# Patient Record
Sex: FEMALE | Race: WHITE | NOT HISPANIC OR LATINO | URBAN - METROPOLITAN AREA
[De-identification: names, ages, dates, MRNs, and addresses within clinical notes are randomized per-mention and may not be internally consistent; named-entity substitution may affect disease eponyms.]

---

## 2017-02-14 ENCOUNTER — OUTPATIENT (OUTPATIENT)
Dept: OUTPATIENT SERVICES | Facility: HOSPITAL | Age: 52
LOS: 1 days | Discharge: HOME | End: 2017-02-14

## 2017-02-14 DIAGNOSIS — C50.919 MALIGNANT NEOPLASM OF UNSPECIFIED SITE OF UNSPECIFIED FEMALE BREAST: Chronic | ICD-10-CM

## 2017-02-14 DIAGNOSIS — C50.911 MALIGNANT NEOPLASM OF UNSPECIFIED SITE OF RIGHT FEMALE BREAST: ICD-10-CM

## 2017-02-14 DIAGNOSIS — D05.91 UNSPECIFIED TYPE OF CARCINOMA IN SITU OF RIGHT BREAST: Chronic | ICD-10-CM

## 2017-02-16 ENCOUNTER — APPOINTMENT (OUTPATIENT)
Dept: HEMATOLOGY ONCOLOGY | Facility: CLINIC | Age: 52
End: 2017-02-16

## 2017-02-16 VITALS
SYSTOLIC BLOOD PRESSURE: 120 MMHG | DIASTOLIC BLOOD PRESSURE: 78 MMHG | RESPIRATION RATE: 16 BRPM | WEIGHT: 127.87 LBS | BODY MASS INDEX: 24.16 KG/M2 | TEMPERATURE: 98.2 F | OXYGEN SATURATION: 100 % | HEART RATE: 78 BPM

## 2017-02-16 DIAGNOSIS — E78.5 HYPERLIPIDEMIA, UNSPECIFIED: ICD-10-CM

## 2017-02-16 DIAGNOSIS — R73.01 IMPAIRED FASTING GLUCOSE: ICD-10-CM

## 2017-05-11 ENCOUNTER — APPOINTMENT (OUTPATIENT)
Dept: DERMATOLOGY | Facility: CLINIC | Age: 52
End: 2017-05-11

## 2017-05-11 VITALS — SYSTOLIC BLOOD PRESSURE: 100 MMHG | DIASTOLIC BLOOD PRESSURE: 60 MMHG

## 2017-05-11 DIAGNOSIS — I78.1 NEVUS, NON-NEOPLASTIC: ICD-10-CM

## 2017-05-11 DIAGNOSIS — Z12.83 ENCOUNTER FOR SCREENING FOR MALIGNANT NEOPLASM OF SKIN: ICD-10-CM

## 2017-05-11 DIAGNOSIS — D22.9 MELANOCYTIC NEVI, UNSPECIFIED: ICD-10-CM

## 2017-05-11 DIAGNOSIS — L81.4 OTHER MELANIN HYPERPIGMENTATION: ICD-10-CM

## 2017-08-02 ENCOUNTER — FORM ENCOUNTER (OUTPATIENT)
Age: 52
End: 2017-08-02

## 2017-08-03 ENCOUNTER — APPOINTMENT (OUTPATIENT)
Dept: ULTRASOUND IMAGING | Facility: CLINIC | Age: 52
End: 2017-08-03
Payer: COMMERCIAL

## 2017-08-03 ENCOUNTER — LABORATORY RESULT (OUTPATIENT)
Age: 52
End: 2017-08-03

## 2017-08-03 ENCOUNTER — OUTPATIENT (OUTPATIENT)
Dept: OUTPATIENT SERVICES | Facility: HOSPITAL | Age: 52
LOS: 1 days | End: 2017-08-03
Payer: COMMERCIAL

## 2017-08-03 DIAGNOSIS — C50.919 MALIGNANT NEOPLASM OF UNSPECIFIED SITE OF UNSPECIFIED FEMALE BREAST: Chronic | ICD-10-CM

## 2017-08-03 DIAGNOSIS — Z00.8 ENCOUNTER FOR OTHER GENERAL EXAMINATION: ICD-10-CM

## 2017-08-03 DIAGNOSIS — D05.91 UNSPECIFIED TYPE OF CARCINOMA IN SITU OF RIGHT BREAST: Chronic | ICD-10-CM

## 2017-08-03 PROCEDURE — 76641 ULTRASOUND BREAST COMPLETE: CPT | Mod: 26,50

## 2017-08-03 PROCEDURE — 76856 US EXAM PELVIC COMPLETE: CPT | Mod: 26,59

## 2017-08-03 PROCEDURE — 76830 TRANSVAGINAL US NON-OB: CPT | Mod: 26

## 2017-08-03 PROCEDURE — 76830 TRANSVAGINAL US NON-OB: CPT

## 2017-08-03 PROCEDURE — 76641 ULTRASOUND BREAST COMPLETE: CPT

## 2017-08-03 PROCEDURE — 76856 US EXAM PELVIC COMPLETE: CPT

## 2017-08-04 ENCOUNTER — RESULT REVIEW (OUTPATIENT)
Age: 52
End: 2017-08-04

## 2017-08-07 ENCOUNTER — RESULT REVIEW (OUTPATIENT)
Age: 52
End: 2017-08-07

## 2017-08-07 ENCOUNTER — OUTPATIENT (OUTPATIENT)
Dept: OUTPATIENT SERVICES | Facility: HOSPITAL | Age: 52
LOS: 1 days | Discharge: HOME | End: 2017-08-07

## 2017-08-07 DIAGNOSIS — C50.919 MALIGNANT NEOPLASM OF UNSPECIFIED SITE OF UNSPECIFIED FEMALE BREAST: Chronic | ICD-10-CM

## 2017-08-07 DIAGNOSIS — D05.91 UNSPECIFIED TYPE OF CARCINOMA IN SITU OF RIGHT BREAST: Chronic | ICD-10-CM

## 2017-08-07 DIAGNOSIS — C50.911 MALIGNANT NEOPLASM OF UNSPECIFIED SITE OF RIGHT FEMALE BREAST: ICD-10-CM

## 2017-08-09 ENCOUNTER — APPOINTMENT (OUTPATIENT)
Dept: HEMATOLOGY ONCOLOGY | Facility: CLINIC | Age: 52
End: 2017-08-09
Payer: COMMERCIAL

## 2017-08-09 VITALS
OXYGEN SATURATION: 98 % | WEIGHT: 130.07 LBS | RESPIRATION RATE: 16 BRPM | DIASTOLIC BLOOD PRESSURE: 79 MMHG | SYSTOLIC BLOOD PRESSURE: 124 MMHG | HEART RATE: 66 BPM | TEMPERATURE: 98.1 F | BODY MASS INDEX: 24.58 KG/M2

## 2017-08-09 DIAGNOSIS — R74.0 NONSPECIFIC ELEVATION OF LEVELS OF TRANSAMINASE AND LACTIC ACID DEHYDROGENASE [LDH]: ICD-10-CM

## 2017-08-09 PROCEDURE — 99214 OFFICE O/P EST MOD 30 MIN: CPT

## 2017-09-02 ENCOUNTER — LABORATORY RESULT (OUTPATIENT)
Age: 52
End: 2017-09-02

## 2017-10-20 ENCOUNTER — RX RENEWAL (OUTPATIENT)
Age: 52
End: 2017-10-20

## 2017-11-12 ENCOUNTER — RX RENEWAL (OUTPATIENT)
Age: 52
End: 2017-11-12

## 2018-02-01 ENCOUNTER — OUTPATIENT (OUTPATIENT)
Dept: OUTPATIENT SERVICES | Facility: HOSPITAL | Age: 53
LOS: 1 days | Discharge: HOME | End: 2018-02-01

## 2018-02-01 DIAGNOSIS — C50.919 MALIGNANT NEOPLASM OF UNSPECIFIED SITE OF UNSPECIFIED FEMALE BREAST: Chronic | ICD-10-CM

## 2018-02-01 DIAGNOSIS — C50.911 MALIGNANT NEOPLASM OF UNSPECIFIED SITE OF RIGHT FEMALE BREAST: ICD-10-CM

## 2018-02-01 DIAGNOSIS — D05.91 UNSPECIFIED TYPE OF CARCINOMA IN SITU OF RIGHT BREAST: Chronic | ICD-10-CM

## 2018-02-01 DIAGNOSIS — D72.819 DECREASED WHITE BLOOD CELL COUNT, UNSPECIFIED: ICD-10-CM

## 2018-02-01 DIAGNOSIS — M85.80 OTHER SPECIFIED DISORDERS OF BONE DENSITY AND STRUCTURE, UNSPECIFIED SITE: ICD-10-CM

## 2018-02-03 ENCOUNTER — LABORATORY RESULT (OUTPATIENT)
Age: 53
End: 2018-02-03

## 2018-02-05 LAB
ALBUMIN SERPL ELPH-MCNC: 4.5 G/DL
ALP BLD-CCNC: 82 U/L
ALT SERPL-CCNC: 25 U/L
ANION GAP SERPL CALC-SCNC: 12 MMOL/L
AST SERPL-CCNC: 33 U/L
BASOPHILS # BLD AUTO: 0.02 K/UL
BASOPHILS NFR BLD AUTO: 0.6 %
BILIRUB SERPL-MCNC: 0.4 MG/DL
BUN SERPL-MCNC: 18 MG/DL
CALCIUM SERPL-MCNC: 9.5 MG/DL
CANCER AG27-29 SERPL-ACNC: 15.6 U/ML
CEA SERPL-MCNC: 1.1 NG/ML
CHLORIDE SERPL-SCNC: 104 MMOL/L
CHOLEST SERPL-MCNC: 194 MG/DL
CHOLEST/HDLC SERPL: 2.8 RATIO
CO2 SERPL-SCNC: 28 MMOL/L
CREAT SERPL-MCNC: 0.81 MG/DL
EOSINOPHIL # BLD AUTO: 0.09 K/UL
EOSINOPHIL NFR BLD AUTO: 2.5 %
ESTIMATED AVERAGE GLUCOSE: 114 MG/DL
GLUCOSE SERPL-MCNC: 101 MG/DL
HBA1C MFR BLD HPLC: 5.6 %
HCT VFR BLD CALC: 37.6 %
HDLC SERPL-MCNC: 69 MG/DL
HGB BLD-MCNC: 12.7 G/DL
IMM GRANULOCYTES NFR BLD AUTO: 0 %
LDLC SERPL CALC-MCNC: 116 MG/DL
LYMPHOCYTES # BLD AUTO: 1.57 K/UL
LYMPHOCYTES NFR BLD AUTO: 43.3 %
MAN DIFF?: NORMAL
MCHC RBC-ENTMCNC: 30.5 PG
MCHC RBC-ENTMCNC: 33.8 GM/DL
MCV RBC AUTO: 90.4 FL
MONOCYTES # BLD AUTO: 0.29 K/UL
MONOCYTES NFR BLD AUTO: 8 %
NEUTROPHILS # BLD AUTO: 1.66 K/UL
NEUTROPHILS NFR BLD AUTO: 45.6 %
PLATELET # BLD AUTO: 200 K/UL
POTASSIUM SERPL-SCNC: 4.7 MMOL/L
PROT SERPL-MCNC: 6.9 G/DL
RBC # BLD: 4.16 M/UL
RBC # FLD: 13.2 %
SODIUM SERPL-SCNC: 144 MMOL/L
TRIGL SERPL-MCNC: 45 MG/DL
WBC # FLD AUTO: 3.63 K/UL

## 2018-02-06 ENCOUNTER — APPOINTMENT (OUTPATIENT)
Dept: INFUSION THERAPY | Facility: HOSPITAL | Age: 53
End: 2018-02-06

## 2018-02-06 ENCOUNTER — APPOINTMENT (OUTPATIENT)
Dept: HEMATOLOGY ONCOLOGY | Facility: CLINIC | Age: 53
End: 2018-02-06
Payer: COMMERCIAL

## 2018-02-06 VITALS
WEIGHT: 136.69 LBS | OXYGEN SATURATION: 99 % | HEART RATE: 69 BPM | TEMPERATURE: 98.3 F | SYSTOLIC BLOOD PRESSURE: 107 MMHG | DIASTOLIC BLOOD PRESSURE: 71 MMHG | BODY MASS INDEX: 25.83 KG/M2 | RESPIRATION RATE: 16 BRPM

## 2018-02-06 DIAGNOSIS — Z23 ENCOUNTER FOR IMMUNIZATION: ICD-10-CM

## 2018-02-06 PROCEDURE — 99214 OFFICE O/P EST MOD 30 MIN: CPT

## 2018-04-27 ENCOUNTER — MEDICATION RENEWAL (OUTPATIENT)
Age: 53
End: 2018-04-27

## 2018-07-05 ENCOUNTER — FORM ENCOUNTER (OUTPATIENT)
Age: 53
End: 2018-07-05

## 2018-07-06 ENCOUNTER — OUTPATIENT (OUTPATIENT)
Dept: OUTPATIENT SERVICES | Facility: HOSPITAL | Age: 53
LOS: 1 days | End: 2018-07-06
Payer: COMMERCIAL

## 2018-07-06 ENCOUNTER — APPOINTMENT (OUTPATIENT)
Dept: RADIOLOGY | Facility: CLINIC | Age: 53
End: 2018-07-06
Payer: COMMERCIAL

## 2018-07-06 DIAGNOSIS — C50.919 MALIGNANT NEOPLASM OF UNSPECIFIED SITE OF UNSPECIFIED FEMALE BREAST: Chronic | ICD-10-CM

## 2018-07-06 DIAGNOSIS — Z79.899 OTHER LONG TERM (CURRENT) DRUG THERAPY: ICD-10-CM

## 2018-07-06 DIAGNOSIS — D05.91 UNSPECIFIED TYPE OF CARCINOMA IN SITU OF RIGHT BREAST: Chronic | ICD-10-CM

## 2018-07-06 PROCEDURE — 77080 DXA BONE DENSITY AXIAL: CPT

## 2018-07-06 PROCEDURE — 77080 DXA BONE DENSITY AXIAL: CPT | Mod: 26

## 2018-07-30 ENCOUNTER — LABORATORY RESULT (OUTPATIENT)
Age: 53
End: 2018-07-30

## 2018-08-01 ENCOUNTER — OUTPATIENT (OUTPATIENT)
Dept: OUTPATIENT SERVICES | Facility: HOSPITAL | Age: 53
LOS: 1 days | Discharge: HOME | End: 2018-08-01

## 2018-08-01 DIAGNOSIS — C50.919 MALIGNANT NEOPLASM OF UNSPECIFIED SITE OF UNSPECIFIED FEMALE BREAST: Chronic | ICD-10-CM

## 2018-08-01 DIAGNOSIS — C50.911 MALIGNANT NEOPLASM OF UNSPECIFIED SITE OF RIGHT FEMALE BREAST: ICD-10-CM

## 2018-08-01 DIAGNOSIS — D05.91 UNSPECIFIED TYPE OF CARCINOMA IN SITU OF RIGHT BREAST: Chronic | ICD-10-CM

## 2018-08-05 ENCOUNTER — FORM ENCOUNTER (OUTPATIENT)
Age: 53
End: 2018-08-05

## 2018-08-06 ENCOUNTER — OUTPATIENT (OUTPATIENT)
Dept: OUTPATIENT SERVICES | Facility: HOSPITAL | Age: 53
LOS: 1 days | End: 2018-08-06
Payer: COMMERCIAL

## 2018-08-06 ENCOUNTER — APPOINTMENT (OUTPATIENT)
Dept: ULTRASOUND IMAGING | Facility: CLINIC | Age: 53
End: 2018-08-06
Payer: COMMERCIAL

## 2018-08-06 DIAGNOSIS — D05.91 UNSPECIFIED TYPE OF CARCINOMA IN SITU OF RIGHT BREAST: Chronic | ICD-10-CM

## 2018-08-06 DIAGNOSIS — C50.919 MALIGNANT NEOPLASM OF UNSPECIFIED SITE OF UNSPECIFIED FEMALE BREAST: Chronic | ICD-10-CM

## 2018-08-06 DIAGNOSIS — Z00.8 ENCOUNTER FOR OTHER GENERAL EXAMINATION: ICD-10-CM

## 2018-08-06 PROCEDURE — 76641 ULTRASOUND BREAST COMPLETE: CPT

## 2018-08-06 PROCEDURE — 76641 ULTRASOUND BREAST COMPLETE: CPT | Mod: 26,50

## 2018-08-09 ENCOUNTER — APPOINTMENT (OUTPATIENT)
Dept: HEMATOLOGY ONCOLOGY | Facility: CLINIC | Age: 53
End: 2018-08-09
Payer: COMMERCIAL

## 2018-08-09 VITALS
BODY MASS INDEX: 25.83 KG/M2 | WEIGHT: 136.69 LBS | SYSTOLIC BLOOD PRESSURE: 120 MMHG | OXYGEN SATURATION: 98 % | HEART RATE: 68 BPM | TEMPERATURE: 99.1 F | DIASTOLIC BLOOD PRESSURE: 78 MMHG

## 2018-08-09 DIAGNOSIS — R73.09 OTHER ABNORMAL GLUCOSE: ICD-10-CM

## 2018-08-09 PROCEDURE — 99214 OFFICE O/P EST MOD 30 MIN: CPT

## 2018-08-09 RX ORDER — TOBRAMYCIN AND DEXAMETHASONE 3; 1 MG/ML; MG/ML
0.3-0.1 SUSPENSION/ DROPS OPHTHALMIC
Qty: 5 | Refills: 0 | Status: DISCONTINUED | COMMUNITY
Start: 2018-07-05

## 2018-08-09 RX ORDER — CYCLOBENZAPRINE HYDROCHLORIDE 10 MG/1
10 TABLET, FILM COATED ORAL
Qty: 30 | Refills: 0 | Status: DISCONTINUED | COMMUNITY
Start: 2018-05-24

## 2018-08-09 RX ORDER — METHYLPREDNISOLONE 4 MG/1
4 TABLET ORAL
Qty: 21 | Refills: 0 | Status: DISCONTINUED | COMMUNITY
Start: 2018-05-18

## 2018-08-09 RX ORDER — MELOXICAM 15 MG/1
15 TABLET ORAL
Qty: 30 | Refills: 0 | Status: DISCONTINUED | COMMUNITY
Start: 2018-05-21

## 2018-10-16 ENCOUNTER — RX RENEWAL (OUTPATIENT)
Age: 53
End: 2018-10-16

## 2018-11-05 PROBLEM — R73.09 ELEVATED GLUCOSE LEVEL: Status: ACTIVE | Noted: 2018-08-09

## 2019-02-06 ENCOUNTER — LABORATORY RESULT (OUTPATIENT)
Age: 54
End: 2019-02-06

## 2019-02-25 ENCOUNTER — OUTPATIENT (OUTPATIENT)
Dept: OUTPATIENT SERVICES | Facility: HOSPITAL | Age: 54
LOS: 1 days | Discharge: HOME | End: 2019-02-25

## 2019-02-25 ENCOUNTER — APPOINTMENT (OUTPATIENT)
Dept: HEMATOLOGY ONCOLOGY | Facility: CLINIC | Age: 54
End: 2019-02-25
Payer: COMMERCIAL

## 2019-02-25 VITALS
RESPIRATION RATE: 16 BRPM | BODY MASS INDEX: 25.83 KG/M2 | SYSTOLIC BLOOD PRESSURE: 120 MMHG | OXYGEN SATURATION: 97 % | HEART RATE: 82 BPM | TEMPERATURE: 98.2 F | WEIGHT: 136.68 LBS | DIASTOLIC BLOOD PRESSURE: 81 MMHG

## 2019-02-25 DIAGNOSIS — C50.919 MALIGNANT NEOPLASM OF UNSPECIFIED SITE OF UNSPECIFIED FEMALE BREAST: Chronic | ICD-10-CM

## 2019-02-25 DIAGNOSIS — C50.911 MALIGNANT NEOPLASM OF UNSPECIFIED SITE OF RIGHT FEMALE BREAST: ICD-10-CM

## 2019-02-25 DIAGNOSIS — D05.91 UNSPECIFIED TYPE OF CARCINOMA IN SITU OF RIGHT BREAST: Chronic | ICD-10-CM

## 2019-02-25 DIAGNOSIS — M19.042 PRIMARY OSTEOARTHRITIS, LEFT HAND: ICD-10-CM

## 2019-02-25 PROCEDURE — 99214 OFFICE O/P EST MOD 30 MIN: CPT

## 2019-02-26 ENCOUNTER — TRANSCRIPTION ENCOUNTER (OUTPATIENT)
Age: 54
End: 2019-02-26

## 2019-03-01 PROBLEM — M19.042 ARTHRITIS OF FINGER OF LEFT HAND: Status: ACTIVE | Noted: 2019-02-25

## 2019-03-01 NOTE — HISTORY OF PRESENT ILLNESS
[Disease: _____________________] : Disease: [unfilled] [T: ___] : T[unfilled] [N: ___] : N[unfilled] [M: ___] : M[unfilled] [AJCC Stage: ____] : AJCC Stage: [unfilled] [Treatment Protocol] : Treatment Protocol [de-identified] : The patient presented in 2012,at  age 47, with microcalcifications on mammogram.\par \par \par \par Stereotactic biopsy was positive for moderately differentiated infiltrating ductal carcinoma with lymphovascular invasion which was ER positive (60%), ME positive (80%) and HER-2/bobby negative.\par \par \par \par In June 6, 2012 Dr. Gerald Euceda performed bilateral mastectomies with bilateral sentinel lymph node biopsies and  immediate placement of saline expanders by Dr. Miguel Lomeli.\par \par \par \par Pathology from the right breast was positive for moderately differentiated infiltrating ductal carcinoma with diffuse ill-defined nests of tumor cells involving all 4 quadrants of the breast. There was a distinct 0.8 cm focus of invasive carcinoma in the upper inner quadrant. There  was one right sentinel lymph node which was positive only on IHC for isolated tumor cells measuring less than 0.2 mm.\par \par \par \par Pathology from the left breast demonstrated LCIS and was negative for invasive cancer. There were 7-negative left sentinel lymph nodes.\par \par \par \par Postoperatively the patient received systemic chemotherapy with AC-Taxol extending from July 18, 2012 through October 24, 2012. She received a cumulative dose of Adriamycin at 384 mg (240 mg/meter squared).\par \par \par \par The patient received radiation therapy to the chest wall under the supervision of Dr. Charo Ballesteros.\par \par \par \par The patient took tamoxifen from January 2013 to February 2014. She started treatment with anastrozole in February 2014 which caused joint pain. She has been on exemestane since March 1, 2014.\par \par \par \par The patient had her implants removed on July 16, 2014 and bilateral PRICILA flap reconstructions performed\par \par  Comprehensive BRACAnalysis was negative on 4/26/2018. ALIX testing was denied by her insurance. The availability of additional genetic counseling and testing was again discussed with the patient on 2/25/2016, but she opted not to pursue it.\par   [de-identified] : Moderately differentiated infiltrating ductal carcinoma ER + GA+ Her 2 bobby - [FreeTextEntry1] : Start Exemestane 03/01/2014. 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 The patient states she was discharged.\par \par No intercurrent medical events since prior visit.\par \par Overall the patient state she feel well since last seen.\par \par No other interval event since last seen.\par \par \par \par \par \par \par \par \par

## 2019-03-01 NOTE — REVIEW OF SYSTEMS
[Negative] : Allergic/Immunologic [FreeTextEntry2] : Energy WNL.. S/P flu vaccination 1/2019. [FreeTextEntry7] : Had colonoscopy 3/21/2016 with Dr Prudence Merrill, tubular adenoma removed. [FreeTextEntry8] : Saw GYN  Dr Romero 10/2/2018, sees him q 6 months.Next follow up 4/15/2019. No  vaginalspotting/bleeding. [FreeTextEntry9] : Most recent bone density 07/06/2018----------osteopenia [de-identified] : Hair unimproved. Saw dermatologist Dr Quintero 5/11/2017, exam  with benign finding. [de-identified] : Unchanged anxiety and depression.

## 2019-03-01 NOTE — PHYSICAL EXAM
[Fully active, able to carry on all pre-disease performance without restriction] : Status 0 - Fully active, able to carry on all pre-disease performance without restriction [Normal] : affect appropriate [de-identified] : Bilateral mastectomies with PRICILA flap reconstructions. No suspicious masses. [de-identified] : Transverse scar from PRICILA flap procedure lower abdomen [de-identified] : Mild deformity of PIP joints left hand with overlying erythema. Minimal tenderness.

## 2019-03-03 ENCOUNTER — TRANSCRIPTION ENCOUNTER (OUTPATIENT)
Age: 54
End: 2019-03-03

## 2019-04-14 ENCOUNTER — RESULT REVIEW (OUTPATIENT)
Age: 54
End: 2019-04-14

## 2019-06-26 ENCOUNTER — RX RENEWAL (OUTPATIENT)
Age: 54
End: 2019-06-26

## 2019-08-23 ENCOUNTER — LABORATORY RESULT (OUTPATIENT)
Age: 54
End: 2019-08-23

## 2019-08-28 ENCOUNTER — OUTPATIENT (OUTPATIENT)
Dept: OUTPATIENT SERVICES | Facility: HOSPITAL | Age: 54
LOS: 1 days | Discharge: HOME | End: 2019-08-28

## 2019-08-28 DIAGNOSIS — M85.80 OTHER SPECIFIED DISORDERS OF BONE DENSITY AND STRUCTURE, UNSPECIFIED SITE: ICD-10-CM

## 2019-08-28 DIAGNOSIS — C50.919 MALIGNANT NEOPLASM OF UNSPECIFIED SITE OF UNSPECIFIED FEMALE BREAST: Chronic | ICD-10-CM

## 2019-08-28 DIAGNOSIS — C50.911 MALIGNANT NEOPLASM OF UNSPECIFIED SITE OF RIGHT FEMALE BREAST: ICD-10-CM

## 2019-08-28 DIAGNOSIS — D72.819 DECREASED WHITE BLOOD CELL COUNT, UNSPECIFIED: ICD-10-CM

## 2019-08-28 DIAGNOSIS — D05.91 UNSPECIFIED TYPE OF CARCINOMA IN SITU OF RIGHT BREAST: Chronic | ICD-10-CM

## 2019-08-29 ENCOUNTER — APPOINTMENT (OUTPATIENT)
Dept: HEMATOLOGY ONCOLOGY | Facility: CLINIC | Age: 54
End: 2019-08-29
Payer: COMMERCIAL

## 2019-08-29 VITALS
HEART RATE: 61 BPM | RESPIRATION RATE: 18 BRPM | SYSTOLIC BLOOD PRESSURE: 127 MMHG | TEMPERATURE: 98.7 F | WEIGHT: 147.05 LBS | OXYGEN SATURATION: 100 % | DIASTOLIC BLOOD PRESSURE: 85 MMHG | BODY MASS INDEX: 27.78 KG/M2

## 2019-08-29 DIAGNOSIS — Z80.9 FAMILY HISTORY OF MALIGNANT NEOPLASM, UNSPECIFIED: ICD-10-CM

## 2019-08-29 PROCEDURE — 99215 OFFICE O/P EST HI 40 MIN: CPT

## 2019-08-29 RX ORDER — TOBRAMYCIN 3 MG/ML
0.3 SOLUTION/ DROPS OPHTHALMIC
Qty: 5 | Refills: 0 | Status: DISCONTINUED | COMMUNITY
Start: 2019-05-29

## 2019-08-29 RX ORDER — OSELTAMIVIR PHOSPHATE 75 MG/1
75 CAPSULE ORAL
Qty: 10 | Refills: 0 | Status: DISCONTINUED | COMMUNITY
Start: 2019-06-03

## 2019-08-29 RX ORDER — CEPHALEXIN 500 MG/1
500 CAPSULE ORAL
Qty: 14 | Refills: 0 | Status: DISCONTINUED | COMMUNITY
Start: 2019-03-16

## 2019-08-29 NOTE — PHYSICAL EXAM
[Fully active, able to carry on all pre-disease performance without restriction] : Status 0 - Fully active, able to carry on all pre-disease performance without restriction [Normal] : affect appropriate [de-identified] : Transverse scar from PRICILA flap procedure lower abdomen [de-identified] : Bilateral mastectomies with PRICILA flap reconstructions. No suspicious masses. [de-identified] : Mild deformity of PIP joints left hand with overlying erythema. Minimal tenderness.

## 2019-08-29 NOTE — REVIEW OF SYSTEMS
[Insomnia] : insomnia [Depression] : depression [Anxiety] : anxiety [Negative] : Allergic/Immunologic [FreeTextEntry2] : Energy WNL.. S/P flu vaccination 1/2019. [FreeTextEntry7] : Had colonoscopy 3/21/2016 with Dr Prudence Merrill, tubular adenoma removed. [FreeTextEntry8] : Saw GYN  Dr Romero [id number],. Has appointment scheduled for 4/20/2020.. No  vaginal spotting/bleeding. [FreeTextEntry9] : Most recent bone density 07/06/2018. [de-identified] : Hair unimproved.  [de-identified] : Unchanged anxiety and depression.

## 2019-08-29 NOTE — HISTORY OF PRESENT ILLNESS
[Disease: _____________________] : Disease: [unfilled] [T: ___] : T[unfilled] [N: ___] : N[unfilled] [M: ___] : M[unfilled] [AJCC Stage: ____] : AJCC Stage: [unfilled] [Treatment Protocol] : Treatment Protocol [de-identified] : The patient presented in 2012,at  age 47, with microcalcifications on mammogram.\par \par \par \par Stereotactic biopsy was positive for moderately differentiated infiltrating ductal carcinoma with lymphovascular invasion which was ER positive (60%), NE positive (80%) and HER-2/bobby negative.\par \par \par \par In June 6, 2012 Dr. Gerald Euceda performed bilateral mastectomies with bilateral sentinel lymph node biopsies and  immediate placement of saline expanders by Dr. Miguel Lomeli.\par \par \par \par Pathology from the right breast was positive for moderately differentiated infiltrating ductal carcinoma with diffuse ill-defined nests of tumor cells involving all 4 quadrants of the breast. There was a distinct 0.8 cm focus of invasive carcinoma in the upper inner quadrant. There  was one right sentinel lymph node which was positive only on IHC for isolated tumor cells measuring less than 0.2 mm.\par \par \par \par Pathology from the left breast demonstrated LCIS and was negative for invasive cancer. There were 7-negative left sentinel lymph nodes.\par \par \par \par Postoperatively the patient received systemic chemotherapy with AC-Taxol extending from July 18, 2012 through October 24, 2012. She received a cumulative dose of Adriamycin at 384 mg (240 mg/meter squared).\par \par \par \par The patient received radiation therapy to the chest wall under the supervision of Dr. Charo Ballesteros.\par \par \par \par The patient took tamoxifen from January 2013 to February 2014. She started treatment with anastrozole in February 2014 which caused joint pain. She has been on exemestane since March 1, 2014.\par \par \par \par The patient had her implants removed on July 16, 2014 and bilateral PRICILA flap reconstructions performed\par \par  Comprehensive BRACAnalysis was negative on 4/26/2018. ALIX testing was denied by her insurance. The availability of additional genetic counseling and testing was again discussed with the patient during multiple visits, but she opted not to pursue it.\par   [de-identified] : Moderately differentiated infiltrating ductal carcinoma ER + MS+ Her 2 bobby - [FreeTextEntry1] : Tamoxifen 1/2013 - 2/2014\par Start Exemestane 03/01/2014. [de-identified] :  The patient has been 7  years 93   months since breast cancer diagnosis.\par \par The patient completed chemotherapy 6 years 7 months  ago.\par \par The patient completed 1 year 1  month of Tamoxifen 5  years 5  months  ago\par \par The patient has been on exemestane for 5  years 5  months, decreased   vasomotor symptoms, mostly at night..\par \par No further  postmenopausal bleeding..\par \par No longer sees breast surgeon nor plastic surgeon. The patient states she was discharged.\par \par No intercurrent medical events since prior visit.\par \par \par \par \par \par \par \par \par \par

## 2019-10-29 ENCOUNTER — RX RENEWAL (OUTPATIENT)
Age: 54
End: 2019-10-29

## 2019-12-31 ENCOUNTER — RX RENEWAL (OUTPATIENT)
Age: 54
End: 2019-12-31

## 2020-01-22 ENCOUNTER — OUTPATIENT (OUTPATIENT)
Dept: OUTPATIENT SERVICES | Facility: HOSPITAL | Age: 55
LOS: 1 days | End: 2020-01-22
Payer: COMMERCIAL

## 2020-01-22 ENCOUNTER — APPOINTMENT (OUTPATIENT)
Dept: MRI IMAGING | Facility: CLINIC | Age: 55
End: 2020-01-22
Payer: COMMERCIAL

## 2020-01-22 ENCOUNTER — APPOINTMENT (OUTPATIENT)
Dept: RADIOLOGY | Facility: CLINIC | Age: 55
End: 2020-01-22
Payer: COMMERCIAL

## 2020-01-22 DIAGNOSIS — Z00.8 ENCOUNTER FOR OTHER GENERAL EXAMINATION: ICD-10-CM

## 2020-01-22 DIAGNOSIS — C50.919 MALIGNANT NEOPLASM OF UNSPECIFIED SITE OF UNSPECIFIED FEMALE BREAST: Chronic | ICD-10-CM

## 2020-01-22 DIAGNOSIS — D05.91 UNSPECIFIED TYPE OF CARCINOMA IN SITU OF RIGHT BREAST: Chronic | ICD-10-CM

## 2020-01-22 PROCEDURE — 73030 X-RAY EXAM OF SHOULDER: CPT | Mod: 26,LT

## 2020-01-22 PROCEDURE — 72141 MRI NECK SPINE W/O DYE: CPT | Mod: 26

## 2020-01-22 PROCEDURE — 73030 X-RAY EXAM OF SHOULDER: CPT

## 2020-01-22 PROCEDURE — 72141 MRI NECK SPINE W/O DYE: CPT

## 2020-02-26 ENCOUNTER — LABORATORY RESULT (OUTPATIENT)
Age: 55
End: 2020-02-26

## 2020-02-29 ENCOUNTER — OUTPATIENT (OUTPATIENT)
Dept: OUTPATIENT SERVICES | Facility: HOSPITAL | Age: 55
LOS: 1 days | Discharge: HOME | End: 2020-02-29

## 2020-02-29 DIAGNOSIS — C50.919 MALIGNANT NEOPLASM OF UNSPECIFIED SITE OF UNSPECIFIED FEMALE BREAST: Chronic | ICD-10-CM

## 2020-02-29 DIAGNOSIS — D72.819 DECREASED WHITE BLOOD CELL COUNT, UNSPECIFIED: ICD-10-CM

## 2020-02-29 DIAGNOSIS — D05.91 UNSPECIFIED TYPE OF CARCINOMA IN SITU OF RIGHT BREAST: Chronic | ICD-10-CM

## 2020-02-29 DIAGNOSIS — C50.911 MALIGNANT NEOPLASM OF UNSPECIFIED SITE OF RIGHT FEMALE BREAST: ICD-10-CM

## 2020-02-29 DIAGNOSIS — M85.80 OTHER SPECIFIED DISORDERS OF BONE DENSITY AND STRUCTURE, UNSPECIFIED SITE: ICD-10-CM

## 2020-03-05 ENCOUNTER — APPOINTMENT (OUTPATIENT)
Dept: HEMATOLOGY ONCOLOGY | Facility: CLINIC | Age: 55
End: 2020-03-05
Payer: COMMERCIAL

## 2020-03-05 VITALS
OXYGEN SATURATION: 100 % | HEART RATE: 92 BPM | RESPIRATION RATE: 17 BRPM | TEMPERATURE: 98.5 F | SYSTOLIC BLOOD PRESSURE: 119 MMHG | WEIGHT: 144.4 LBS | DIASTOLIC BLOOD PRESSURE: 75 MMHG | BODY MASS INDEX: 27.28 KG/M2

## 2020-03-05 DIAGNOSIS — D12.6 BENIGN NEOPLASM OF COLON, UNSPECIFIED: ICD-10-CM

## 2020-03-05 DIAGNOSIS — M54.12 RADICULOPATHY, CERVICAL REGION: ICD-10-CM

## 2020-03-05 PROCEDURE — 99215 OFFICE O/P EST HI 40 MIN: CPT

## 2020-03-05 RX ORDER — IBUPROFEN 200 MG/1
200 TABLET ORAL
Refills: 0 | Status: DISCONTINUED | COMMUNITY
Start: 2017-02-16 | End: 2020-03-05

## 2020-03-05 RX ORDER — MULTIVIT-MIN/IRON/FOLIC ACID/K 18-600-40
500 CAPSULE ORAL
Refills: 0 | Status: ACTIVE | COMMUNITY
Start: 2020-03-05

## 2020-03-07 PROBLEM — M54.12 CERVICAL RADICULOPATHY, ACUTE: Status: ACTIVE | Noted: 2020-03-07

## 2020-03-07 NOTE — HISTORY OF PRESENT ILLNESS
[Disease: _____________________] : Disease: [unfilled] [T: ___] : T[unfilled] [N: ___] : N[unfilled] [M: ___] : M[unfilled] [AJCC Stage: ____] : AJCC Stage: [unfilled] [Treatment Protocol] : Treatment Protocol [de-identified] : The patient presented in 2012,at  age 47, with microcalcifications on mammogram.\par \par \par \par Stereotactic biopsy was positive for moderately differentiated infiltrating ductal carcinoma with lymphovascular invasion which was ER positive (60%), ID positive (80%) and HER-2/bobby negative.\par \par \par \par In June 6, 2012 Dr. Gerald Euceda performed bilateral mastectomies with bilateral sentinel lymph node biopsies and  immediate placement of saline expanders by Dr. Miguel Lomeli.\par \par \par \par Pathology from the right breast was positive for moderately differentiated infiltrating ductal carcinoma with diffuse ill-defined nests of tumor cells involving all 4 quadrants of the breast. There was a distinct 0.8 cm focus of invasive carcinoma in the upper inner quadrant. There  was one right sentinel lymph node which was positive only on IHC for isolated tumor cells measuring less than 0.2 mm.\par \par \par \par Pathology from the left breast demonstrated LCIS and was negative for invasive cancer. There were 7-negative left sentinel lymph nodes.\par \par \par \par Postoperatively the patient received systemic chemotherapy with AC-Taxol extending from July 18, 2012 through October 24, 2012. She received a cumulative dose of Adriamycin at 384 mg (240 mg/meter squared).\par \par \par \par The patient received radiation therapy to the chest wall under the supervision of Dr. Charo Ballesteros.\par \par \par \par The patient took tamoxifen from January 2013 to February 2014. She started treatment with anastrozole in February 2014 which caused joint pain. She has been on exemestane since March 1, 2014.\par \par \par \par The patient had her implants removed on July 16, 2014 and bilateral PRICILA flap reconstructions performed\par \par  Comprehensive BRACAnalysis was negative on 4/26/2018. ALIX testing was denied by her insurance. The availability of additional genetic counseling and testing was again discussed with the patient during multiple visits, but she opted not to pursue it.\par   [de-identified] : Moderately differentiated infiltrating ductal carcinoma ER + VA+ Her 2 bobby - [FreeTextEntry1] : Tamoxifen 1/2013 - 2/2014\par Start Exemestane 03/01/2014. [de-identified] :  The patient has been 7  years 9   months since breast cancer diagnosis.\par \par The patient completed chemotherapy 7 years 2 months  ago.\par \par The patient completed 1 year 1  month of Tamoxifen 6  years  1 month ago\par \par The patient has been on exemestane for 6  years, vasomotor symptoms mostly at night.\par \par No further  postmenopausal bleeding.\par \par No longer sees breast surgeon nor plastic surgeon. The patient states she was discharged.\par \par Developed left sided neck pain 3 months ago with tingling and weakness left hand.  Saw Dr Jacobo, pain management.  Had MRI C spine done and Xray shoulder. Has herniations C4 and C7 and DJD. Started cyclobenzaprine, meloxicam and PT. Improving.\par \par Changing primary care to Dr Sylvia Giraldo.\par \par No other interval events.\par \par \par \par \par \par \par \par \par \par

## 2020-03-07 NOTE — REVIEW OF SYSTEMS
[Insomnia] : insomnia [Anxiety] : anxiety [Depression] : depression [Negative] : Allergic/Immunologic [FreeTextEntry2] : Energy WNL.. S/P flu vaccination fall 2019. [FreeTextEntry7] : Had colonoscopy 3/21/2016 with Dr Prudence Merrill, tubular adenoma removed. To schedule appointment with Dr Merrill for follow up. [FreeTextEntry8] : Saw GYN  Dr Romero [id number],. Has appointment scheduled for 6/2/2020.. No  vaginal spotting/bleeding. [FreeTextEntry9] : Most recent bone density 07/06/2018. [de-identified] : Hair unimproved.  [de-identified] : Unchanged anxiety and depression.

## 2020-03-07 NOTE — PHYSICAL EXAM
[Fully active, able to carry on all pre-disease performance without restriction] : Status 0 - Fully active, able to carry on all pre-disease performance without restriction [Normal] : full range of motion and no deformities appreciated [de-identified] : Bilateral mastectomies with PRICILA flap reconstructions. No suspicious masses. [de-identified] : Transverse scar from PRICILA flap procedure lower abdomen [de-identified] : Erythema right axilla, probable contact dermatitis.

## 2020-07-09 ENCOUNTER — RESULT REVIEW (OUTPATIENT)
Age: 55
End: 2020-07-09

## 2020-07-09 ENCOUNTER — OUTPATIENT (OUTPATIENT)
Dept: OUTPATIENT SERVICES | Facility: HOSPITAL | Age: 55
LOS: 1 days | End: 2020-07-09
Payer: COMMERCIAL

## 2020-07-09 ENCOUNTER — APPOINTMENT (OUTPATIENT)
Dept: RADIOLOGY | Facility: CLINIC | Age: 55
End: 2020-07-09
Payer: COMMERCIAL

## 2020-07-09 ENCOUNTER — APPOINTMENT (OUTPATIENT)
Dept: ULTRASOUND IMAGING | Facility: CLINIC | Age: 55
End: 2020-07-09
Payer: COMMERCIAL

## 2020-07-09 DIAGNOSIS — C50.919 MALIGNANT NEOPLASM OF UNSPECIFIED SITE OF UNSPECIFIED FEMALE BREAST: Chronic | ICD-10-CM

## 2020-07-09 DIAGNOSIS — Z00.8 ENCOUNTER FOR OTHER GENERAL EXAMINATION: ICD-10-CM

## 2020-07-09 DIAGNOSIS — D05.91 UNSPECIFIED TYPE OF CARCINOMA IN SITU OF RIGHT BREAST: Chronic | ICD-10-CM

## 2020-07-09 PROCEDURE — 76830 TRANSVAGINAL US NON-OB: CPT

## 2020-07-09 PROCEDURE — 76830 TRANSVAGINAL US NON-OB: CPT | Mod: 26

## 2020-07-09 PROCEDURE — 77080 DXA BONE DENSITY AXIAL: CPT | Mod: 26

## 2020-07-09 PROCEDURE — 76856 US EXAM PELVIC COMPLETE: CPT | Mod: 26,59

## 2020-07-09 PROCEDURE — 77080 DXA BONE DENSITY AXIAL: CPT

## 2020-07-09 PROCEDURE — 76856 US EXAM PELVIC COMPLETE: CPT

## 2020-10-22 ENCOUNTER — LABORATORY RESULT (OUTPATIENT)
Age: 55
End: 2020-10-22

## 2020-10-29 ENCOUNTER — APPOINTMENT (OUTPATIENT)
Dept: HEMATOLOGY ONCOLOGY | Facility: CLINIC | Age: 55
End: 2020-10-29

## 2020-12-01 ENCOUNTER — OUTPATIENT (OUTPATIENT)
Dept: OUTPATIENT SERVICES | Facility: HOSPITAL | Age: 55
LOS: 1 days | Discharge: HOME | End: 2020-12-01

## 2020-12-01 DIAGNOSIS — C50.919 MALIGNANT NEOPLASM OF UNSPECIFIED SITE OF UNSPECIFIED FEMALE BREAST: Chronic | ICD-10-CM

## 2020-12-01 DIAGNOSIS — D05.91 UNSPECIFIED TYPE OF CARCINOMA IN SITU OF RIGHT BREAST: Chronic | ICD-10-CM

## 2020-12-01 DIAGNOSIS — M85.80 OTHER SPECIFIED DISORDERS OF BONE DENSITY AND STRUCTURE, UNSPECIFIED SITE: ICD-10-CM

## 2020-12-01 DIAGNOSIS — D72.819 DECREASED WHITE BLOOD CELL COUNT, UNSPECIFIED: ICD-10-CM

## 2020-12-01 DIAGNOSIS — C50.911 MALIGNANT NEOPLASM OF UNSPECIFIED SITE OF RIGHT FEMALE BREAST: ICD-10-CM

## 2020-12-03 ENCOUNTER — APPOINTMENT (OUTPATIENT)
Dept: HEMATOLOGY ONCOLOGY | Facility: CLINIC | Age: 55
End: 2020-12-03
Payer: COMMERCIAL

## 2020-12-03 VITALS
RESPIRATION RATE: 16 BRPM | DIASTOLIC BLOOD PRESSURE: 83 MMHG | HEIGHT: 62.01 IN | TEMPERATURE: 97.4 F | HEART RATE: 81 BPM | OXYGEN SATURATION: 98 % | WEIGHT: 160.49 LBS | SYSTOLIC BLOOD PRESSURE: 133 MMHG | BODY MASS INDEX: 29.16 KG/M2

## 2020-12-03 DIAGNOSIS — Z79.899 OTHER LONG TERM (CURRENT) DRUG THERAPY: ICD-10-CM

## 2020-12-03 DIAGNOSIS — T45.2X1A POISONING BY VITAMINS, ACCIDENTAL (UNINTENTIONAL), INITIAL ENCOUNTER: ICD-10-CM

## 2020-12-03 DIAGNOSIS — E55.9 VITAMIN D DEFICIENCY, UNSPECIFIED: ICD-10-CM

## 2020-12-03 DIAGNOSIS — S21.009A UNSPECIFIED OPEN WOUND OF UNSPECIFIED BREAST, INITIAL ENCOUNTER: ICD-10-CM

## 2020-12-03 DIAGNOSIS — M79.89 OTHER SPECIFIED SOFT TISSUE DISORDERS: ICD-10-CM

## 2020-12-03 DIAGNOSIS — N63.0 UNSPECIFIED LUMP IN UNSPECIFIED BREAST: ICD-10-CM

## 2020-12-03 PROCEDURE — 99215 OFFICE O/P EST HI 40 MIN: CPT

## 2020-12-03 PROCEDURE — 99072 ADDL SUPL MATRL&STAF TM PHE: CPT

## 2021-03-05 PROBLEM — Z00.00 ENCOUNTER FOR PREVENTIVE HEALTH EXAMINATION: Noted: 2021-03-05

## 2021-04-23 LAB
ALBUMIN SERPL ELPH-MCNC: 4.5 G/DL
ALP BLD-CCNC: 93 U/L
ALT SERPL-CCNC: 47 U/L
AST SERPL-CCNC: 38 U/L
BILIRUB DIRECT SERPL-MCNC: 0.1 MG/DL
BILIRUB INDIRECT SERPL-MCNC: 0.3 MG/DL
BILIRUB SERPL-MCNC: 0.4 MG/DL
CANCER AG27-29 SERPL-ACNC: 19.6 U/ML
CEA SERPL-MCNC: 1.1 NG/ML
PROT SERPL-MCNC: 7.1 G/DL

## 2021-04-23 NOTE — PHYSICAL EXAM
[Fully active, able to carry on all pre-disease performance without restriction] : Status 0 - Fully active, able to carry on all pre-disease performance without restriction [Normal] : affect appropriate [de-identified] : Bilateral mastectomies with PRICILA flap reconstructions. No suspicious masses. [de-identified] : Transverse scar from PRICILA flap procedure lower abdomen [de-identified] : Erythema right axilla, probable contact dermatitis.
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
## 2021-04-23 NOTE — REVIEW OF SYSTEMS
[Insomnia] : insomnia [Anxiety] : anxiety [Depression] : depression [Negative] : Allergic/Immunologic [de-identified] : Unchanged anxiety and depression.

## 2021-06-04 ENCOUNTER — OUTPATIENT (OUTPATIENT)
Dept: OUTPATIENT SERVICES | Facility: HOSPITAL | Age: 56
LOS: 1 days | End: 2021-06-04
Payer: COMMERCIAL

## 2021-06-04 ENCOUNTER — APPOINTMENT (OUTPATIENT)
Dept: ULTRASOUND IMAGING | Facility: CLINIC | Age: 56
End: 2021-06-04
Payer: COMMERCIAL

## 2021-06-04 DIAGNOSIS — R14.0 ABDOMINAL DISTENSION (GASEOUS): ICD-10-CM

## 2021-06-04 DIAGNOSIS — D05.91 UNSPECIFIED TYPE OF CARCINOMA IN SITU OF RIGHT BREAST: Chronic | ICD-10-CM

## 2021-06-04 DIAGNOSIS — R10.13 EPIGASTRIC PAIN: ICD-10-CM

## 2021-06-04 DIAGNOSIS — C50.919 MALIGNANT NEOPLASM OF UNSPECIFIED SITE OF UNSPECIFIED FEMALE BREAST: Chronic | ICD-10-CM

## 2021-06-04 PROCEDURE — 76856 US EXAM PELVIC COMPLETE: CPT

## 2021-06-04 PROCEDURE — 76700 US EXAM ABDOM COMPLETE: CPT

## 2021-06-04 PROCEDURE — 76700 US EXAM ABDOM COMPLETE: CPT | Mod: 26

## 2021-06-04 PROCEDURE — 76830 TRANSVAGINAL US NON-OB: CPT | Mod: 26

## 2021-06-04 PROCEDURE — 76856 US EXAM PELVIC COMPLETE: CPT | Mod: 26,59

## 2021-06-04 PROCEDURE — 76830 TRANSVAGINAL US NON-OB: CPT

## 2021-06-08 ENCOUNTER — OUTPATIENT (OUTPATIENT)
Dept: OUTPATIENT SERVICES | Facility: HOSPITAL | Age: 56
LOS: 1 days | Discharge: HOME | End: 2021-06-08

## 2021-06-08 ENCOUNTER — APPOINTMENT (OUTPATIENT)
Dept: HEMATOLOGY ONCOLOGY | Facility: CLINIC | Age: 56
End: 2021-06-08
Payer: COMMERCIAL

## 2021-06-08 VITALS
OXYGEN SATURATION: 99 % | BODY MASS INDEX: 28.26 KG/M2 | HEART RATE: 76 BPM | WEIGHT: 149.67 LBS | HEIGHT: 61.02 IN | DIASTOLIC BLOOD PRESSURE: 81 MMHG | RESPIRATION RATE: 16 BRPM | SYSTOLIC BLOOD PRESSURE: 111 MMHG | TEMPERATURE: 97.2 F

## 2021-06-08 DIAGNOSIS — C50.919 MALIGNANT NEOPLASM OF UNSPECIFIED SITE OF UNSPECIFIED FEMALE BREAST: Chronic | ICD-10-CM

## 2021-06-08 DIAGNOSIS — D05.91 UNSPECIFIED TYPE OF CARCINOMA IN SITU OF RIGHT BREAST: Chronic | ICD-10-CM

## 2021-06-08 DIAGNOSIS — F41.9 ANXIETY DISORDER, UNSPECIFIED: ICD-10-CM

## 2021-06-08 DIAGNOSIS — C50.911 MALIGNANT NEOPLASM OF UNSPECIFIED SITE OF RIGHT FEMALE BREAST: ICD-10-CM

## 2021-06-08 PROCEDURE — 99072 ADDL SUPL MATRL&STAF TM PHE: CPT

## 2021-06-08 PROCEDURE — 99214 OFFICE O/P EST MOD 30 MIN: CPT

## 2021-06-08 RX ORDER — MELOXICAM 7.5 MG/1
7.5 TABLET ORAL
Qty: 30 | Refills: 0 | Status: DISCONTINUED | COMMUNITY
Start: 2020-02-26 | End: 2021-06-08

## 2021-06-08 RX ORDER — CYCLOBENZAPRINE HYDROCHLORIDE 5 MG/1
5 TABLET, FILM COATED ORAL
Qty: 30 | Refills: 0 | Status: DISCONTINUED | COMMUNITY
Start: 2020-02-26 | End: 2021-06-08

## 2021-06-08 NOTE — REVIEW OF SYSTEMS
[Insomnia] : insomnia [Anxiety] : anxiety [Depression] : depression [Negative] : Allergic/Immunologic [de-identified] : Unchanged anxiety and depression.

## 2021-06-08 NOTE — PHYSICAL EXAM
[Fully active, able to carry on all pre-disease performance without restriction] : Status 0 - Fully active, able to carry on all pre-disease performance without restriction [Normal] : affect appropriate [de-identified] : Bilateral mastectomies with PRICILA flap reconstructions. Significant concavity in right upper chest; No chest wall masses or axillary lymphadenopathy [de-identified] : Transverse scar from PRICILA flap procedure lower abdomen [de-identified] : Erythema right axilla, probable contact dermatitis.
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
## 2021-12-09 LAB — 25(OH)D3 SERPL-MCNC: 71.4 NG/ML

## 2021-12-10 LAB
ALBUMIN SERPL ELPH-MCNC: 4.7 G/DL
ALP BLD-CCNC: 124 U/L
ALT SERPL-CCNC: 71 U/L
ANION GAP SERPL CALC-SCNC: 10 MMOL/L
AST SERPL-CCNC: 66 U/L
BASOPHILS # BLD AUTO: 0.02 K/UL
BASOPHILS NFR BLD AUTO: 0.5 %
BILIRUB SERPL-MCNC: 0.2 MG/DL
BUN SERPL-MCNC: 15 MG/DL
CALCIUM SERPL-MCNC: 9.9 MG/DL
CHLORIDE SERPL-SCNC: 99 MMOL/L
CO2 SERPL-SCNC: 29 MMOL/L
CREAT SERPL-MCNC: 0.86 MG/DL
EOSINOPHIL # BLD AUTO: 0.05 K/UL
EOSINOPHIL NFR BLD AUTO: 1.3 %
GLUCOSE SERPL-MCNC: 98 MG/DL
HCT VFR BLD CALC: 41.9 %
HGB BLD-MCNC: 13.9 G/DL
IMM GRANULOCYTES NFR BLD AUTO: 0 %
LYMPHOCYTES # BLD AUTO: 1.84 K/UL
LYMPHOCYTES NFR BLD AUTO: 47.3 %
MAN DIFF?: NORMAL
MCHC RBC-ENTMCNC: 29.7 PG
MCHC RBC-ENTMCNC: 33.2 GM/DL
MCV RBC AUTO: 89.5 FL
MONOCYTES # BLD AUTO: 0.34 K/UL
MONOCYTES NFR BLD AUTO: 8.7 %
NEUTROPHILS # BLD AUTO: 1.64 K/UL
NEUTROPHILS NFR BLD AUTO: 42.2 %
PLATELET # BLD AUTO: 229 K/UL
POTASSIUM SERPL-SCNC: 4.6 MMOL/L
PROT SERPL-MCNC: 7.5 G/DL
RBC # BLD: 4.68 M/UL
RBC # FLD: 13.7 %
SODIUM SERPL-SCNC: 138 MMOL/L
WBC # FLD AUTO: 3.89 K/UL

## 2021-12-11 ENCOUNTER — OUTPATIENT (OUTPATIENT)
Dept: OUTPATIENT SERVICES | Facility: HOSPITAL | Age: 56
LOS: 1 days | Discharge: HOME | End: 2021-12-11

## 2021-12-11 DIAGNOSIS — D05.91 UNSPECIFIED TYPE OF CARCINOMA IN SITU OF RIGHT BREAST: Chronic | ICD-10-CM

## 2021-12-11 DIAGNOSIS — C50.919 MALIGNANT NEOPLASM OF UNSPECIFIED SITE OF UNSPECIFIED FEMALE BREAST: Chronic | ICD-10-CM

## 2021-12-11 DIAGNOSIS — C50.911 MALIGNANT NEOPLASM OF UNSPECIFIED SITE OF RIGHT FEMALE BREAST: ICD-10-CM

## 2021-12-14 ENCOUNTER — APPOINTMENT (OUTPATIENT)
Dept: HEMATOLOGY ONCOLOGY | Facility: CLINIC | Age: 56
End: 2021-12-14
Payer: COMMERCIAL

## 2021-12-14 VITALS
HEART RATE: 80 BPM | BODY MASS INDEX: 28.72 KG/M2 | SYSTOLIC BLOOD PRESSURE: 119 MMHG | WEIGHT: 152.12 LBS | OXYGEN SATURATION: 99 % | RESPIRATION RATE: 16 BRPM | DIASTOLIC BLOOD PRESSURE: 84 MMHG | TEMPERATURE: 97.2 F

## 2021-12-14 DIAGNOSIS — Z87.898 PERSONAL HISTORY OF OTHER SPECIFIED CONDITIONS: ICD-10-CM

## 2021-12-14 DIAGNOSIS — R79.89 OTHER SPECIFIED ABNORMAL FINDINGS OF BLOOD CHEMISTRY: ICD-10-CM

## 2021-12-14 DIAGNOSIS — K63.5 POLYP OF COLON: ICD-10-CM

## 2021-12-14 DIAGNOSIS — Z71.83 ENCOUNTER FOR NONPROCREATIVE GENETIC COUNSELING: ICD-10-CM

## 2021-12-14 PROCEDURE — 99214 OFFICE O/P EST MOD 30 MIN: CPT

## 2021-12-14 NOTE — REVIEW OF SYSTEMS
[Insomnia] : insomnia [Anxiety] : anxiety [Depression] : depression [Negative] : Allergic/Immunologic [de-identified] : Unchanged anxiety and depression.

## 2021-12-14 NOTE — PHYSICAL EXAM
[Fully active, able to carry on all pre-disease performance without restriction] : Status 0 - Fully active, able to carry on all pre-disease performance without restriction [Normal] : affect appropriate [de-identified] : Bilateral mastectomies with PRICILA flap reconstructions. Significant concavity in right upper chest; No chest wall masses or axillary lymphadenopathy [de-identified] : Transverse scar from PRICILA flap procedure lower abdomen [de-identified] : Erythema right axilla, probable contact dermatitis.

## 2021-12-14 NOTE — HISTORY OF PRESENT ILLNESS
[Disease: _____________________] : Disease: [unfilled] [T: ___] : T[unfilled] [N: ___] : N[unfilled] [M: ___] : M[unfilled] [AJCC Stage: ____] : AJCC Stage: [unfilled] [Treatment Protocol] : Treatment Protocol [de-identified] : Right breast cancer at age 47 \par 2012 Right Stereotactic biopsy positive for moderately differentiated infiltrating ductal carcinoma with lymphovascular invasion which was ER positive (60%), [place] positive (80%) and HER-2/bobby negative.\par 6/6/12  Dr. Gerald Euceda performed bilateral mastectomies with bilateral sentinel lymph node biopsies and  immediate placement of saline expanders by Dr. Miguel Lomeli. Pathology from the right breast was positive for moderately differentiated infiltrating ductal carcinoma with diffuse ill-defined nests of tumor cells involving all 4 quadrants of the breast. There was a distinct 0.8 cm focus of invasive carcinoma in the upper inner quadrant. There was one right sentinel lymph node which was positive only on IHC for isolated tumor cells measuring less than 0.2 mm. Pathology from the left breast demonstrated LCIS and was negative for invasive cancer. There were 7 negative left sentinel lymph nodes. \par 7/18/12 - 10/24/12 Adjuvant chemotherapy with dose dense AC x 4 followed by Taxol x 4. She received a cumulative dose of Adriamycin at 384 mg (240 mg/meter squared).\par The patient received radiation therapy to the chest wall under the supervision of Dr. Charo Ballesteros.\par 1/2013 - 2/2014 Tamoxifen\par 2/2014 - 3/2014 Anastrozole started but discontinued due to joint pain\par 3/1/14 Exemestane started\par 7/16/14 Implants removed and bilateral PRICILA flap reconstruction performed\par 4/26/18 Comprehensive BRACAnalysis was negative. ALIX testing was denied by her insurance. The availability of additional genetic counseling and testing was again discussed with the patient during multiple visits, but she opted not to pursue it.\par   [de-identified] : Multifocal moderately differentiated infiltrating ductal carcinoma, ER 60%, DE 80%, Her-2 negative, 0/1 SLN (+ITC) [FreeTextEntry1] : Tamoxifen 1/2013 - 2/2014\par Start Exemestane 03/01/2014. [de-identified] :  Angel Luis has been on exemestane since 3/2014 after completing a year of tamoxifen from 1/2013 to 2/2014. \par She has been tolerating the AI well overall after the switch from the previous drugs.\par She has had no further vaginal bleeding.\par She was started on Pristiq in 9/21 for anxiety and depression by her psychiatrist which has been helpful.\par She cares for her parents and has 3 kids and a demanding job so has struggled with both anxiety and depression as well as insomnia.\par No longer sees breast surgeon or plastic surgeon. The patient states she was discharged.\par She is fully vaccinated for COVID with the Pfizer vaccine and got her booster in 12/21.\par \par Routine Health Maintenance:\par Mammogram: s/p bilateral mastectomies\par Pap Smear: 6/2020 with Dr. Romero\par Bone density: 7/9/20 showed T scores of -1.1 in spine, -1.1 in femoral neck and -1.0 in total hip\par Colonoscopy: 3/21/16 with Dr Prudence Merrill, tubular adenoma removed; 8/24/20 showed one sessile serrated adenoma and 4 year follow up is recommended\par Genetics: 4/26/18 Comprehensive BRACAnalysis was negative. ALIX testing was denied by her insurance. The availability of additional genetic counseling and testing was again discussed with the patient during multiple visits by Dr. Pat, but she opted not to pursue it.\par \par \par \par \par \par \par \par \par \par \par \par

## 2022-02-14 LAB
ALBUMIN SERPL ELPH-MCNC: 4.7 G/DL
ALP BLD-CCNC: 81 U/L
ALT SERPL-CCNC: 24 U/L
ANION GAP SERPL CALC-SCNC: 11 MMOL/L
AST SERPL-CCNC: 27 U/L
BASOPHILS # BLD AUTO: 0.04 K/UL
BASOPHILS NFR BLD AUTO: 0.6 %
BILIRUB SERPL-MCNC: 0.3 MG/DL
BUN SERPL-MCNC: 18 MG/DL
CALCIUM SERPL-MCNC: 9.6 MG/DL
CHLORIDE SERPL-SCNC: 100 MMOL/L
CO2 SERPL-SCNC: 28 MMOL/L
CREAT SERPL-MCNC: 1.12 MG/DL
EOSINOPHIL # BLD AUTO: 0.05 K/UL
EOSINOPHIL NFR BLD AUTO: 0.8 %
GLUCOSE SERPL-MCNC: 92 MG/DL
HCT VFR BLD CALC: 39.2 %
HGB BLD-MCNC: 13 G/DL
IMM GRANULOCYTES NFR BLD AUTO: 0.2 %
LYMPHOCYTES # BLD AUTO: 2.37 K/UL
LYMPHOCYTES NFR BLD AUTO: 37.6 %
MAN DIFF?: NORMAL
MCHC RBC-ENTMCNC: 29.4 PG
MCHC RBC-ENTMCNC: 33.2 GM/DL
MCV RBC AUTO: 88.7 FL
MONOCYTES # BLD AUTO: 0.41 K/UL
MONOCYTES NFR BLD AUTO: 6.5 %
NEUTROPHILS # BLD AUTO: 3.42 K/UL
NEUTROPHILS NFR BLD AUTO: 54.3 %
PLATELET # BLD AUTO: 235 K/UL
POTASSIUM SERPL-SCNC: 4.7 MMOL/L
PROT SERPL-MCNC: 7 G/DL
RBC # BLD: 4.42 M/UL
RBC # FLD: 13 %
SODIUM SERPL-SCNC: 139 MMOL/L
WBC # FLD AUTO: 6.3 K/UL

## 2022-06-07 ENCOUNTER — OUTPATIENT (OUTPATIENT)
Dept: OUTPATIENT SERVICES | Facility: HOSPITAL | Age: 57
LOS: 1 days | Discharge: HOME | End: 2022-06-07

## 2022-06-07 DIAGNOSIS — C50.919 MALIGNANT NEOPLASM OF UNSPECIFIED SITE OF UNSPECIFIED FEMALE BREAST: Chronic | ICD-10-CM

## 2022-06-07 DIAGNOSIS — D05.91 UNSPECIFIED TYPE OF CARCINOMA IN SITU OF RIGHT BREAST: Chronic | ICD-10-CM

## 2022-06-07 DIAGNOSIS — M85.80 OTHER SPECIFIED DISORDERS OF BONE DENSITY AND STRUCTURE, UNSPECIFIED SITE: ICD-10-CM

## 2022-06-07 DIAGNOSIS — C50.911 MALIGNANT NEOPLASM OF UNSPECIFIED SITE OF RIGHT FEMALE BREAST: ICD-10-CM

## 2022-06-10 LAB
25(OH)D3 SERPL-MCNC: 51.6 NG/ML
ALBUMIN SERPL ELPH-MCNC: 4.9 G/DL
ALP BLD-CCNC: 79 U/L
ALT SERPL-CCNC: 22 U/L
ANION GAP SERPL CALC-SCNC: 14 MMOL/L
AST SERPL-CCNC: 24 U/L
BASOPHILS # BLD AUTO: 0.03 K/UL
BASOPHILS NFR BLD AUTO: 0.4 %
BILIRUB SERPL-MCNC: 0.8 MG/DL
BUN SERPL-MCNC: 12 MG/DL
CALCIUM SERPL-MCNC: 10.1 MG/DL
CHLORIDE SERPL-SCNC: 99 MMOL/L
CO2 SERPL-SCNC: 26 MMOL/L
CREAT SERPL-MCNC: 0.77 MG/DL
EGFR: 91 ML/MIN/1.73M2
EOSINOPHIL # BLD AUTO: 0.05 K/UL
EOSINOPHIL NFR BLD AUTO: 0.7 %
GLUCOSE SERPL-MCNC: 108 MG/DL
HCT VFR BLD CALC: 41.5 %
HGB BLD-MCNC: 14 G/DL
IMM GRANULOCYTES NFR BLD AUTO: 0.3 %
LYMPHOCYTES # BLD AUTO: 2.13 K/UL
LYMPHOCYTES NFR BLD AUTO: 29.5 %
MAN DIFF?: NORMAL
MCHC RBC-ENTMCNC: 30 PG
MCHC RBC-ENTMCNC: 33.7 GM/DL
MCV RBC AUTO: 89.1 FL
MONOCYTES # BLD AUTO: 0.51 K/UL
MONOCYTES NFR BLD AUTO: 7.1 %
NEUTROPHILS # BLD AUTO: 4.49 K/UL
NEUTROPHILS NFR BLD AUTO: 62 %
PLATELET # BLD AUTO: 269 K/UL
POTASSIUM SERPL-SCNC: 4.7 MMOL/L
PROT SERPL-MCNC: 7.5 G/DL
RBC # BLD: 4.66 M/UL
RBC # FLD: 13.7 %
SODIUM SERPL-SCNC: 139 MMOL/L
WBC # FLD AUTO: 7.23 K/UL

## 2022-06-14 ENCOUNTER — APPOINTMENT (OUTPATIENT)
Dept: HEMATOLOGY ONCOLOGY | Facility: CLINIC | Age: 57
End: 2022-06-14
Payer: COMMERCIAL

## 2022-06-14 VITALS
TEMPERATURE: 96.7 F | BODY MASS INDEX: 26.35 KG/M2 | SYSTOLIC BLOOD PRESSURE: 110 MMHG | DIASTOLIC BLOOD PRESSURE: 76 MMHG | OXYGEN SATURATION: 98 % | RESPIRATION RATE: 16 BRPM | HEART RATE: 79 BPM | WEIGHT: 139.55 LBS

## 2022-06-14 PROCEDURE — 99214 OFFICE O/P EST MOD 30 MIN: CPT

## 2022-06-14 RX ORDER — DIAZEPAM 2 MG/1
2 TABLET ORAL
Qty: 30 | Refills: 0 | Status: ACTIVE | COMMUNITY
Start: 2022-03-01

## 2022-06-14 RX ORDER — DESVENLAFAXINE SUCCINATE 50 MG/1
50 TABLET, EXTENDED RELEASE ORAL
Refills: 0 | Status: DISCONTINUED | COMMUNITY
Start: 2021-12-14 | End: 2022-06-14

## 2022-06-14 NOTE — REVIEW OF SYSTEMS
[Insomnia] : insomnia [Anxiety] : anxiety [Depression] : depression [Negative] : Allergic/Immunologic [de-identified] : Unchanged anxiety and depression.

## 2022-06-14 NOTE — HISTORY OF PRESENT ILLNESS
[Disease: _____________________] : Disease: [unfilled] [T: ___] : T[unfilled] [N: ___] : N[unfilled] [M: ___] : M[unfilled] [AJCC Stage: ____] : AJCC Stage: [unfilled] [Treatment Protocol] : Treatment Protocol [de-identified] : Right breast cancer at age 47 \par 2012 Right Stereotactic biopsy positive for moderately differentiated infiltrating ductal carcinoma with lymphovascular invasion which was ER positive (60%), [place] positive (80%) and HER-2/bobby negative.\par 6/6/12  Dr. Gerald Euceda performed bilateral mastectomies with bilateral sentinel lymph node biopsies and  immediate placement of saline expanders by Dr. Miguel Lomeli. Pathology from the right breast was positive for moderately differentiated infiltrating ductal carcinoma with diffuse ill-defined nests of tumor cells involving all 4 quadrants of the breast. There was a distinct 0.8 cm focus of invasive carcinoma in the upper inner quadrant. There was one right sentinel lymph node which was positive only on IHC for isolated tumor cells measuring less than 0.2 mm. Pathology from the left breast demonstrated LCIS and was negative for invasive cancer. There were 7 negative left sentinel lymph nodes. \par 7/18/12 - 10/24/12 Adjuvant chemotherapy with dose dense AC x 4 followed by Taxol x 4. She received a cumulative dose of Adriamycin at 384 mg (240 mg/meter squared).\par The patient received radiation therapy to the chest wall under the supervision of Dr. Charo Ballesteros.\par 1/2013 - 2/2014 Tamoxifen\par 2/2014 - 3/2014 Anastrozole started but discontinued due to joint pain\par 3/1/14 Exemestane started\par 7/16/14 Implants removed and bilateral PRICILA flap reconstruction performed\par 4/26/18 Comprehensive BRACAnalysis was negative. ALIX testing was denied by her insurance. The availability of additional genetic counseling and testing was again discussed with the patient during multiple visits, but she opted not to pursue it.\par   [de-identified] : Multifocal moderately differentiated infiltrating ductal carcinoma, ER 60%, [place] 80%, Her-2 negative, 0/1 SLN (+ITC) [FreeTextEntry1] : Tamoxifen 1/2013 - 2/2014\par Start Exemestane 03/01/2014. [de-identified] :  Angel Luis started exemestane in 3/2014 after completing a year of tamoxifen from 1/2013 to 2/2014 and a brief trial of anastrozole which she did not tolerate\par She has been tolerating the exemestane well with no significant joint pain or hot flashes.\par She has had no further vaginal bleeding.\par She cares for her parents and has 3 kids and a demanding job so has struggled with both anxiety and depression as well as insomnia.\par Her father has been in and out of [place] this spring and is in the hospital now.\par Her children and 22, 19 and 16 and are doing well.\par No longer sees breast surgeon or plastic surgeon. The patient states she was discharged.\par She is fully vaccinated for COVID with the Pfizer vaccine and got her booster in 12/21.\par \par Routine Health Maintenance:\par PCP: Angel Luis Piedra MD\par Mammogram: s/p bilateral mastectomies\par Pap Smear: 6/2020 with Dr. Romero\par Bone density: 7/9/20 showed T scores of -1.1 in spine, -1.1 in femoral neck and -1.0 in total hip\par Colonoscopy: 3/21/16 with Dr Prudence Merrill, tubular adenoma removed; 8/24/20 showed one sessile serrated adenoma and 4 year follow up is recommended\par Genetics: 4/26/18 Comprehensive BRACAnalysis was negative. ALIX testing was denied by her insurance. The availability of additional genetic counseling and testing was again discussed with the patient during multiple visits by Dr. Pat, but she opted not to pursue it.\par \par \par \par \par \par \par \par \par \par \par \par

## 2022-08-10 ENCOUNTER — APPOINTMENT (OUTPATIENT)
Dept: ULTRASOUND IMAGING | Facility: CLINIC | Age: 57
End: 2022-08-10

## 2022-08-10 ENCOUNTER — APPOINTMENT (OUTPATIENT)
Dept: RADIOLOGY | Facility: CLINIC | Age: 57
End: 2022-08-10

## 2022-08-10 PROCEDURE — 76830 TRANSVAGINAL US NON-OB: CPT

## 2022-08-10 PROCEDURE — 76856 US EXAM PELVIC COMPLETE: CPT | Mod: 59

## 2022-08-10 PROCEDURE — 77080 DXA BONE DENSITY AXIAL: CPT

## 2023-02-08 ENCOUNTER — OUTPATIENT (OUTPATIENT)
Dept: OUTPATIENT SERVICES | Facility: HOSPITAL | Age: 58
LOS: 1 days | Discharge: HOME | End: 2023-02-08

## 2023-02-08 DIAGNOSIS — D05.91 UNSPECIFIED TYPE OF CARCINOMA IN SITU OF RIGHT BREAST: Chronic | ICD-10-CM

## 2023-02-08 DIAGNOSIS — C50.911 MALIGNANT NEOPLASM OF UNSPECIFIED SITE OF RIGHT FEMALE BREAST: ICD-10-CM

## 2023-02-08 DIAGNOSIS — C50.919 MALIGNANT NEOPLASM OF UNSPECIFIED SITE OF UNSPECIFIED FEMALE BREAST: Chronic | ICD-10-CM

## 2023-02-14 ENCOUNTER — APPOINTMENT (OUTPATIENT)
Dept: HEMATOLOGY ONCOLOGY | Facility: CLINIC | Age: 58
End: 2023-02-14
Payer: COMMERCIAL

## 2023-02-14 ENCOUNTER — APPOINTMENT (OUTPATIENT)
Dept: HEMATOLOGY ONCOLOGY | Facility: CLINIC | Age: 58
End: 2023-02-14

## 2023-02-14 VITALS
TEMPERATURE: 97.9 F | DIASTOLIC BLOOD PRESSURE: 75 MMHG | WEIGHT: 137.35 LBS | SYSTOLIC BLOOD PRESSURE: 112 MMHG | RESPIRATION RATE: 16 BRPM | HEART RATE: 91 BPM | BODY MASS INDEX: 26.27 KG/M2 | OXYGEN SATURATION: 98 % | HEIGHT: 60.63 IN

## 2023-02-14 DIAGNOSIS — D72.819 DECREASED WHITE BLOOD CELL COUNT, UNSPECIFIED: ICD-10-CM

## 2023-02-14 DIAGNOSIS — C50.911 MALIGNANT NEOPLASM OF UNSPECIFIED SITE OF RIGHT FEMALE BREAST: ICD-10-CM

## 2023-02-14 DIAGNOSIS — N64.4 MASTODYNIA: ICD-10-CM

## 2023-02-14 DIAGNOSIS — R92.8 OTHER ABNORMAL AND INCONCLUSIVE FINDINGS ON DIAGNOSTIC IMAGING OF BREAST: ICD-10-CM

## 2023-02-14 DIAGNOSIS — M85.80 OTHER SPECIFIED DISORDERS OF BONE DENSITY AND STRUCTURE, UNSPECIFIED SITE: ICD-10-CM

## 2023-02-14 PROCEDURE — 99214 OFFICE O/P EST MOD 30 MIN: CPT

## 2023-02-14 NOTE — HISTORY OF PRESENT ILLNESS
[de-identified] : Right breast cancer at age 47 \par 2012 Right Stereotactic biopsy positive for moderately differentiated infiltrating ductal carcinoma with lymphovascular invasion which was ER positive (60%), OK positive (80%) and HER-2/bobby negative.\par 6/6/12  Dr. Gerald Euceda performed bilateral mastectomies with bilateral sentinel lymph node biopsies and  immediate placement of saline expanders by Dr. Miguel Lomeli. Pathology from the right breast was positive for moderately differentiated infiltrating ductal carcinoma with diffuse ill-defined nests of tumor cells involving all 4 quadrants of the breast. There was a distinct 0.8 cm focus of invasive carcinoma in the upper inner quadrant. There was one right sentinel lymph node which was positive only on IHC for isolated tumor cells measuring less than 0.2 mm. Pathology from the left breast demonstrated LCIS and was negative for invasive cancer. There were 7 negative left sentinel lymph nodes. \par 7/18/12 - 10/24/12 Adjuvant chemotherapy with dose dense AC x 4 followed by Taxol x 4. She received a cumulative dose of Adriamycin at 384 mg (240 mg/meter squared).\par The patient received radiation therapy to the chest wall under the supervision of Dr. Charo Ballesteros.\par 1/2013 - 2/2014 Tamoxifen\par 2/2014 - 3/2014 Anastrozole started but discontinued due to joint pain\par 3/1/14 - 2/2023 Exemestane\par 7/16/14 Implants removed and bilateral PRICILA flap reconstruction performed\par 4/26/18 Comprehensive BRACAnalysis was negative. ALIX testing was denied by her insurance. The availability of additional genetic counseling and testing was again discussed with the patient during multiple visits, but she opted not to pursue it.\par   [de-identified] : Multifocal moderately differentiated infiltrating ductal carcinoma, ER 60%, [place] 80%, Her-2 negative, 0/1 SLN (+ITC) [FreeTextEntry1] : Tamoxifen 1/2013 - 2/2014\par Start Exemestane 03/01/2014. [de-identified] :  Angel Luis completed 10 years of endocrine therapy in 01/2023 with tamoxifen for one year and then exemestane for nine years. She is nervous about stopping, but understands that continued therapy does not offer additional protection.\par She no longer sees the breast surgeon or plastic surgeon. The patient states she was discharged.\par \par Routine Health Maintenance:\par PCP: Angel Luis Piedra MD\par Mammogram: s/p bilateral mastectomies\par Pap Smear: 6/2020 with Dr. Romero\par Bone density: 8/10/22 showed T scores of -1.6 in spine, -1.2 in femoral neck and -0.3 in total hip\par                       7/9/20 showed T scores of -1.1 in spine, -1.1 in femoral neck and -1.0 in total hip\par Colonoscopy: 3/21/16 with Dr Prudence Merrill, tubular adenoma removed; 8/24/20 showed one sessile serrated adenoma and 4 year follow up is recommended\par Genetics: 4/26/18 Comprehensive BRACAnalysis was negative. ALIX testing was denied by her insurance. The availability of additional genetic counseling and testing was again discussed with the patient during multiple visits by Dr. Pat, but she opted not to pursue it.\par \par \par \par \par \par \par \par \par \par \par \par

## 2023-02-14 NOTE — PHYSICAL EXAM
[de-identified] : Bilateral mastectomies with PRICILA flap reconstructions. Significant concavity in right upper chest; No chest wall masses or axillary lymphadenopathy [de-identified] : Transverse scar from PRICILA flap procedure lower abdomen

## 2023-10-27 NOTE — REASON FOR VISIT
Aerobic Exercise for a Healthy Heart  Exercise is a lot more than an energy booster and a stress reliever. It also strengthens your heart muscle, lowers your blood pressure and cholesterol, and burns calories. It can also improve your resting muscle tone, and your mood.     Choose an aerobic activity  Choose an activity that makes your heart and lungs work harder than they do when you rest or walk normally. This aerobic exercise can improve the way your heart and other muscles use oxygen. Make it fun by exercising with a friend and choosing an activity you enjoy. Here are some ideas:  Walking  Swimming  Bicycling  Stair climbing  Dancing  Jogging  Gardening  Exercise regularly  If you haven’t been exercising regularly,  get your doctor’s OK first. Then start slowly.  Here are some tips:  Begin exercising 3 times a week for 5 to 10 minutes at a time.  When you feel comfortable, add a few minutes each session.  Slowly build up to exercising 3 to 4 times each week. Each session should last for 40 minutes, on average, and involve moderate- to vigorous-intensity physical activity.  Your goal should be at least 30 minutes of moderate-intensity aerobic activity at least 5 days per week for a total of 150 or at least 25 minutes of vigorous aerobic activity at least 3 days per week for a total of 75 minutes  If you have been given nitroglycerin, be sure to carry it when you exercise.  If you get chest pain (angina) when you’re exercising, stop what you’re doing, take your nitroglycerin, and call your doctor.  [employer] last reviewed this educational content on 6/1/2019 © 2000-2020 [employer]. [address] [place] [address], [place] [address]. All rights reserved. This information is not intended as a substitute for professional medical care. Always follow your healthcare professional's instructions.        Exercise for a Healthier Heart     Exercise with a friend. When activity is fun, you're more likely to stick  with it.   You may wonder how you can improve the health of your heart. If you’re thinking about exercise, you’re on the right track. You don’t need to become an athlete. But you do need a certain amount of brisk exercise to help strengthen your heart. If you have been diagnosed with a heart condition, your healthcare provider may advise exercise to help stabilize your condition. To help make exercise a habit, choose safe, fun activities.   Before you start  Check with your healthcare provider before starting an exercise program. This is especially important if you have not been active for a while. It's also important if you have a long-term (chronic) health problem such as heart disease, diabetes, or obesity. Or if you are at high risk for having these problems.   Why exercise?  Exercising regularly offers many healthy rewards. It can help you do all of the following:  Improve your blood cholesterol level to help prevent further heart trouble  Lower your blood pressure to help prevent a stroke or heart attack  Control diabetes, or reduce your risk of getting this disease  Improve your heart and lung function  Reach and stay at a healthy weight  Make your muscles stronger so you can stay active  Prevent falls and fractures by slowing the loss of bone mass (osteoporosis)  Manage stress better  Reduce your blood pressure  Improve your sense of self and your body image  Exercise tips    Ease into your routine. Set small goals. Then build on them. If you are not sure what your activity level should be, talk with your healthcare provider first before starting an exercise routine.  Exercise on most days. Aim for a total of 150 minutes (2 hours and 30 minutes) or more of moderate-intensity aerobic activity each week. Or 75 minutes (1 hour and 15 minutes) or more of vigorous-intensity aerobic activity each week. Or try for a combination of both. Moderate activity means that you breathe heavier and your heart rate increases  but you can still talk. Think about doing 40 minutes of moderate exercise, 3 to 4 times a week. For best results, activity should last for about 40 minutes to lower blood pressure and cholesterol. It's OK to work up to the 40-minute period over time. Examples of moderate-intensity activity are walking 1 mile in 15 minutes. Or doing 30 to 45 minutes of yard work.  Step up your daily activity level.  Along with your exercise program, try being more active the whole day. Walk instead of drive. Or park further away so that you take more steps each day. Do more household tasks or yard work. You may not be able to meet the advised mount of physical activity. But doing some moderate- or vigorous-intensity aerobic activity can help reduce your risk for heart disease. Your healthcare provider can help you figure out what is best for you.  Choose 1 or more activities you enjoy.  Walking is one of the easiest things you can do. You can also try swimming, riding a bike, dancing, or taking an exercise class.    When to call your healthcare provider  Call your healthcare provider if you have any of these:   Chest pain or feel dizzy or lightheaded  Burning, tightness, pressure, or heaviness in your chest, neck, shoulders, back, or arms  Abnormal shortness of breath  More joint or muscle pain  A very fast or irregular heartbeat (palpitations)  [email or web address] last reviewed this educational content on 7/1/2019 © 2000-2020 [employer]. [address] [place] [id number]. All rights reserved. This information is not intended as a substitute for professional medical care. Always follow your healthcare professional's instructions.        Eating Heart-Healthy Foods  Eating has a big impact on your heart health. In fact, eating healthier can improve several of your heart risks at once. For instance, it helps you manage weight, cholesterol, and blood pressure. Here are ideas to help you make heart-healthy changes without  giving up all the foods and flavors you love.  Getting started  Talk with your healthcare provider about eating plans, such as the DASH or Mediterranean diet. You may also be referred to a dietitian.  Change a few things at a time. Give yourself time to get used to a few eating changes before adding more.  Work to create a tasty, healthy eating plan that you can stick to for the rest of your life.    Goals for healthy eating  Below are some tips to improve your eating habits:  Limit saturated fats and trans fats. Saturated fats raise your levels of cholesterol, so keep these fats to a minimum. They are found in foods such as fatty meats, whole milk, cheese, and palm and coconut oils. Avoid trans fats because they lower good cholesterol as well as raise bad cholesterol. Trans fats are most often found in processed foods.  Reduce sodium (salt) intake. Eating too much salt may increase your blood pressure. Limit your sodium intake to 2,300 milligrams (mg) per day (the amount in 1 teaspoon of salt), or less if your healthcare provider recommends it. Dining out less often and eating fewer processed foods are two great ways to decrease the amount of salt you consume.  Managing calories. A calorie is a unit of energy. Your body burns calories for fuel, but if you eat more calories than your body burns, the extras are stored as fat. Your healthcare provider can help you create a diet plan to manage your calories. This will likely include eating healthier foods as well as exercising regularly. To help you track your progress, keep a diary to record what you eat and how often you exercise.  Choose the right foods  Aim to make these foods staples of your diet. If you have diabetes, you may have different recommendations than what is listed here:  Fruits and vegetables provide plenty of nutrients without a lot of calories. At meals, fill half your plate with these foods. Split the other half of your plate between whole grains  and lean protein.  Whole grains are high in fiber and rich in vitamins and nutrients. Good choices include whole-wheat bread, pasta, and brown rice.  Lean proteins give you nutrition with less fat. Good choices include fish, skinless chicken, and beans.  Low-fat or nonfat dairy provides nutrients without a lot of fat. Try low-fat or nonfat milk, cheese, or yogurt.  Healthy fats can be good for you in small amounts. These are unsaturated fats, such as olive oil, nuts, and fish. Try to have at least 2 servings per week of fatty fish, such as salmon, sardines, mackerel, rainbow trout, and albacore tuna. These contain omega-3 fatty acids, which are good for your heart. Flaxseed is another source of a heart-healthy fat.  More on heart-healthy eating  Read food labels  Healthy eating starts at the grocery store. Be sure to pay attention to food labels on packaged foods. Look for products that are high in fiber and protein, and low in saturated fat, cholesterol, and sodium. Avoid products that contain trans fat. And pay close attention to serving size. For instance, if you plan to eat two servings, double all the numbers on the label.  Prepare food right  A key part of healthy cooking is cutting down on added fat and salt. Look on the internet for lower-fat, lower-sodium recipes. Also, try these tips:  Remove fat from meat and skin from poultry before cooking.  Skim fat from the surface of soups and sauces.  Broil, boil, bake, steam, grill, and microwave food without added fats.  Choose ingredients that spice up your food without adding calories, fat, or sodium. Try these items: horseradish, hot sauce, lemon, mustard, nonfat salad dressings, and vinegar. For salt-free herbs and spices, try basil, cilantro, cinnamon, pepper, and rosemary.  Date Last Reviewed: 10/1/2017  © 4624-0174 [employer]. [address] [place] [address], [place] [address]. All rights reserved. This information is not intended as a substitute for  professional medical care. Always follow your healthcare professional's instructions.        [Follow-Up Visit] : a follow-up [FreeTextEntry2] : right breast cancer

## 2024-03-20 ENCOUNTER — APPOINTMENT (OUTPATIENT)
Dept: UROLOGY | Facility: CLINIC | Age: 59
End: 2024-03-20
Payer: COMMERCIAL

## 2024-03-20 ENCOUNTER — OUTPATIENT (OUTPATIENT)
Dept: OUTPATIENT SERVICES | Facility: HOSPITAL | Age: 59
LOS: 1 days | Discharge: HOME | End: 2024-03-20

## 2024-03-20 VITALS
WEIGHT: 141 LBS | DIASTOLIC BLOOD PRESSURE: 74 MMHG | SYSTOLIC BLOOD PRESSURE: 122 MMHG | BODY MASS INDEX: 27.68 KG/M2 | HEIGHT: 60 IN | HEART RATE: 65 BPM | RESPIRATION RATE: 17 BRPM | TEMPERATURE: 98.2 F

## 2024-03-20 DIAGNOSIS — C50.919 MALIGNANT NEOPLASM OF UNSPECIFIED SITE OF UNSPECIFIED FEMALE BREAST: Chronic | ICD-10-CM

## 2024-03-20 DIAGNOSIS — D05.91 UNSPECIFIED TYPE OF CARCINOMA IN SITU OF RIGHT BREAST: Chronic | ICD-10-CM

## 2024-03-20 DIAGNOSIS — R31.0 GROSS HEMATURIA: ICD-10-CM

## 2024-03-20 DIAGNOSIS — C50.911 MALIGNANT NEOPLASM OF UNSPECIFIED SITE OF RIGHT FEMALE BREAST: ICD-10-CM

## 2024-03-20 DIAGNOSIS — N39.0 URINARY TRACT INFECTION, SITE NOT SPECIFIED: ICD-10-CM

## 2024-03-20 DIAGNOSIS — R31.29 OTHER MICROSCOPIC HEMATURIA: ICD-10-CM

## 2024-03-20 PROCEDURE — G2211 COMPLEX E/M VISIT ADD ON: CPT

## 2024-03-20 PROCEDURE — 99203 OFFICE O/P NEW LOW 30 MIN: CPT

## 2024-03-20 RX ORDER — METHENAMINE HIPPURATE 1 G/1
1 TABLET ORAL DAILY
Qty: 30 | Refills: 6 | Status: ACTIVE | COMMUNITY
Start: 2024-03-20 | End: 1900-01-01

## 2024-03-20 RX ORDER — NITROFURANTOIN (MONOHYDRATE/MACROCRYSTALS) 25; 75 MG/1; MG/1
100 CAPSULE ORAL
Qty: 30 | Refills: 4 | Status: ACTIVE | COMMUNITY
Start: 2024-03-20 | End: 1900-01-01

## 2024-03-20 RX ORDER — NITROFURANTOIN (MONOHYDRATE/MACROCRYSTALS) 25; 75 MG/1; MG/1
100 CAPSULE ORAL
Qty: 42 | Refills: 0 | Status: ACTIVE | COMMUNITY
Start: 2024-03-20 | End: 1900-01-01

## 2024-03-20 NOTE — ASSESSMENT
[FreeTextEntry1] : 58yo F with recurrent UTIs and hematuria  -macrobid x6 weeks -instructed to take macrobid 1 dose post-coital -hippurex x6 months after macrobid, then can take after intercourse only -CT urogram -urine Cx

## 2024-03-20 NOTE — HISTORY OF PRESENT ILLNESS
[FreeTextEntry1] : 60yo F here for recurrent UTIs and MH. Reports multiple UTIs treated by PCP over the past few years, they went about for several months but now they have recurred. She is currently on cefpodoxime for 10-day course but is not feeling better. Sx including abdominal discomfort, urinary frequency, nocturia 2-3, dysuria. Has R flank pain, which has been going on for "a long time" not only during infections. Reports has one episode of gross hematuria, otherwise has only been told she has microscopic hematuria. Denies JANETT Sx, unplanned weight loss, fevers, bowel issues.   Outside Cx results show: Mar 2024 - no growth July 2023 - staph epidermidis Jan 2023 - E faecalis Oct 2022 - strep + E coli   PMH: breast Ca (s/p chemo 2012 f/u endocrine therapy) PSH: tonsillectomy (childhood) Meds: vit D, zinc, zoloft, multivitamin, diazapam, azo SocH: no tobacco or known environmental or chemical exposure All: nkda

## 2024-03-21 ENCOUNTER — OUTPATIENT (OUTPATIENT)
Dept: OUTPATIENT SERVICES | Facility: HOSPITAL | Age: 59
LOS: 1 days | End: 2024-03-21
Payer: COMMERCIAL

## 2024-03-21 ENCOUNTER — RESULT REVIEW (OUTPATIENT)
Age: 59
End: 2024-03-21

## 2024-03-21 ENCOUNTER — APPOINTMENT (OUTPATIENT)
Dept: ULTRASOUND IMAGING | Facility: CLINIC | Age: 59
End: 2024-03-21
Payer: COMMERCIAL

## 2024-03-21 DIAGNOSIS — Z85.3 PERSONAL HISTORY OF MALIGNANT NEOPLASM OF BREAST: ICD-10-CM

## 2024-03-21 DIAGNOSIS — C50.919 MALIGNANT NEOPLASM OF UNSPECIFIED SITE OF UNSPECIFIED FEMALE BREAST: Chronic | ICD-10-CM

## 2024-03-21 DIAGNOSIS — D05.91 UNSPECIFIED TYPE OF CARCINOMA IN SITU OF RIGHT BREAST: Chronic | ICD-10-CM

## 2024-03-21 PROCEDURE — 76856 US EXAM PELVIC COMPLETE: CPT | Mod: 26

## 2024-03-21 PROCEDURE — 76830 TRANSVAGINAL US NON-OB: CPT | Mod: 26

## 2024-03-21 PROCEDURE — 76830 TRANSVAGINAL US NON-OB: CPT

## 2024-03-21 PROCEDURE — 76856 US EXAM PELVIC COMPLETE: CPT

## 2024-03-22 LAB — BACTERIA UR CULT: NORMAL

## 2024-03-27 ENCOUNTER — APPOINTMENT (OUTPATIENT)
Dept: CT IMAGING | Facility: CLINIC | Age: 59
End: 2024-03-27
Payer: COMMERCIAL

## 2024-03-27 ENCOUNTER — OUTPATIENT (OUTPATIENT)
Dept: OUTPATIENT SERVICES | Facility: HOSPITAL | Age: 59
LOS: 1 days | End: 2024-03-27
Payer: COMMERCIAL

## 2024-03-27 DIAGNOSIS — Z00.8 ENCOUNTER FOR OTHER GENERAL EXAMINATION: ICD-10-CM

## 2024-03-27 DIAGNOSIS — C50.919 MALIGNANT NEOPLASM OF UNSPECIFIED SITE OF UNSPECIFIED FEMALE BREAST: Chronic | ICD-10-CM

## 2024-03-27 DIAGNOSIS — D05.91 UNSPECIFIED TYPE OF CARCINOMA IN SITU OF RIGHT BREAST: Chronic | ICD-10-CM

## 2024-03-27 DIAGNOSIS — R31.29 OTHER MICROSCOPIC HEMATURIA: ICD-10-CM

## 2024-03-27 DIAGNOSIS — R31.0 GROSS HEMATURIA: ICD-10-CM

## 2024-03-27 PROCEDURE — 74178 CT ABD&PLV WO CNTR FLWD CNTR: CPT | Mod: 26

## 2024-03-27 PROCEDURE — 74178 CT ABD&PLV WO CNTR FLWD CNTR: CPT

## 2024-04-05 ENCOUNTER — APPOINTMENT (OUTPATIENT)
Dept: INTERNAL MEDICINE | Facility: CLINIC | Age: 59
End: 2024-04-05
Payer: COMMERCIAL

## 2024-04-05 VITALS
BODY MASS INDEX: 27.26 KG/M2 | DIASTOLIC BLOOD PRESSURE: 76 MMHG | WEIGHT: 144.4 LBS | RESPIRATION RATE: 16 BRPM | HEIGHT: 61.02 IN | OXYGEN SATURATION: 98 % | TEMPERATURE: 97.6 F | HEART RATE: 80 BPM | SYSTOLIC BLOOD PRESSURE: 118 MMHG

## 2024-04-05 DIAGNOSIS — Z78.0 ASYMPTOMATIC MENOPAUSAL STATE: ICD-10-CM

## 2024-04-05 DIAGNOSIS — N89.8 OTHER SPECIFIED NONINFLAMMATORY DISORDERS OF VAGINA: ICD-10-CM

## 2024-04-05 PROCEDURE — 99205 OFFICE O/P NEW HI 60 MIN: CPT

## 2024-04-05 RX ORDER — HYALURONIC ACID ESTER 0.2 %
GEL WITH APPLICATOR (GRAM) VAGINAL
Qty: 1 | Refills: 3 | Status: ACTIVE | COMMUNITY
Start: 2024-04-05 | End: 1900-01-01

## 2024-04-11 PROBLEM — Z78.0 POSTMENOPAUSE: Status: ACTIVE | Noted: 2024-04-05

## 2024-04-11 PROBLEM — N89.8 VAGINAL DRYNESS: Status: ACTIVE | Noted: 2024-04-05

## 2024-04-11 NOTE — DISCUSSION/SUMMARY
[Cancer Type / Location / Histology Subtype: ________] : Cancer Type / Location / Histology Subtype: [unfilled] [Diagnosis Date (year): ____] : Diagnosis Date (year): [unfilled] [II] : II [Surgery] : Surgery: Yes [Surgery Date(s) (year): ____] : Surgery Date(s) (year): [unfilled] [Surgical Procedure / Location / Findings: _________] : Surgical Procedure / Location / Findings: [unfilled] [Radiation] : Radiation: Yes [Body Area Treated: _________] : Body Area Treated: [unfilled] [Systemic Therapy (chemotherapy, hormonal therapy, other)] : Systemic Therapy (chemotherapy, hormonal therapy, other): Yes [FreeTextEntry1] : Right breast cancer at age 47 2012 Right Stereotactic biopsy positive for moderately differentiated infiltrating ductal carcinoma with lymphovascular invasion which was ER positive (60%), SC positive (80%) and HER-2/bobby negative. 6/6/12 Dr. Gerald Euceda performed bilateral mastectomies with bilateral sentinel lymph node biopsies and immediate placement of saline expanders by Dr. Miguel Lomeli. Pathology from the right breast was positive for moderately differentiated infiltrating ductal carcinoma with diffuse ill-defined nests of tumor cells involving all 4 quadrants of the breast. There was a distinct 0.8 cm focus of invasive carcinoma in the upper inner quadrant. There was one right sentinel lymph node which was positive only on IHC for isolated tumor cells measuring less than 0.2 mm. Pathology from the left breast demonstrated LCIS and was negative for invasive cancer. There were 7 negative left sentinel lymph nodes. 7/18/12 - 10/24/12 Adjuvant chemotherapy with dose dense Adriamycin Cyclophosphamide x 4 followed by Taxol x 4. She received a cumulative dose of Adriamycin at 384 mg (240 mg/meter squared). The patient received radiation therapy to the chest wall under the supervision of Dr. Charo Ballesteros. 1/2013 - 2/2014 Tamoxifen 2/2014 - 3/2014 Anastrozole started but discontinued due to joint pain 3/1/14 - 2/2023 Exemestane 7/16/14 Implants removed and bilateral PRICILA flap reconstruction performed 4/26/18 Comprehensive BRACAnalysis was negative. ALIX testing was denied by her insurance.   Disease: right breast cancer   Pathology: Multifocal moderately differentiated infiltrating ductal carcinoma, ER 60%, SC 80%, Her-2 negative, 0/1 SLN (+ITC)   TNM stage: TX(m), N0 (i+), M0 AJCC Stage: IIB

## 2024-04-11 NOTE — PAST MEDICAL HISTORY
[Menarche Age ____] : age at menarche was [unfilled] [Menopause Age____] : age at menopause was [unfilled] [Definite ___ (Date)] : the last menstrual period was [unfilled] [Normal Amount/Duration] : it was of a normal amount and duration [Normal Duration] : the duration was normal [de-identified] : chemotherapy induced menopause.

## 2024-04-11 NOTE — HISTORY OF PRESENT ILLNESS
[FreeTextEntry1] : Saw her PCP recently and had a full work up/annual check in. Has been noticing a lot of frequent UTIs: Had 2 in 2021 (5/2021 and 6/2021) Had 2 in 2022 (3/2022 and 10/2022) Had 2 in 2023 (6/23/23 and 6/29/2023) Then had another one in 3/13/2024.   She saw a Urologist for this.  She had a urogram completed which was unremarkable.  She has a 2mm non-obstructing stone in the lower pole of the RIGHT kidney.  She was started on daily Nitrofurantoin which she started last week.  After she completes 42 days of this, she will switch to methenamine three times a week.  She also saw her gynecologist and she had a transvaginal US completed in March 2024 which was normal.   She is concerned because of these UTIs.   She feels like she has a lot of stomach discomfort -- feels gassy all the time and feels uncomfortable. She was working with the [employer] and was able to lose 20 lbs in the last one year.  Her father passed away a couple months ago so she deviated a little from her diet plan.  B -- yogurt, Cheerios, coffee with oat milk. L -- salad with sardines or tuna. D -- fish/chicken and vegetables.   Has regular bowel movements.  Had one episode of C.Diff after one of the antibiotics she took for a UTI years ago.   Drinks wine on occasion but not often.   For exercise, she walks every day.  She was more active before her dad got sick.  She does sit a lot for work so she tries to stay active.  She was doing more weight training activities at home before her dad got sick but has not resumed.  She has noticed a lot more bruising.  She finds that this is more dramatic than it used to be.   She feels like she is noticing her nail beds are changing.   She still gets hot flashes though they are less frequent.  They often wake her up in the middle of the night.  This can happen 4-5 times a night.

## 2024-04-11 NOTE — PLAN
[FreeTextEntry2] : Needs annual breast and chest wall exam. Completed today, next due March 2025.  No indication for routine surveillance imaging given that she is s/p bilateral mastectomy. [FreeTextEntry8] : Due for bone density test now -- order placed today. Needs annual ECG -- last done March 2024 with PCP Needs annual TSH and Vitamin D 25-0H which were completed with her PCP [FreeTextEntry6] : Saw Gyn in June 2023 Colonoscopy was in 2019 -- due this year in 2024.  Follows with Dermatology.

## 2024-04-11 NOTE — HEALTH RISK ASSESSMENT
[I sleep ok, and sometimes I feel tired] : I sleep okay, and sometimes I feel tired [Never] : Never [Yes] : Yes [Monthly or less (1 pt)] : Monthly or less (1 point) [1 or 2 (0 pts)] : 1 or 2 (0 points) [No falls in past year] : Patient reported no falls in the past year [FreeTextEntry6] : Lives with  and 3 children -- ages 25, 21, and 17 [FreeTextEntry8] : Wakes up with hot flushes and needs to urinate.  [de-identified] : occasionally

## 2024-04-11 NOTE — PHYSICAL EXAM
[No Acute Distress] : no acute distress [Well Nourished] : well nourished [No JVD] : no jugular venous distention [Thyroid Normal, No Nodules] : the thyroid was normal and there were no nodules present [No Lymphadenopathy] : no lymphadenopathy [No Respiratory Distress] : no respiratory distress  [No Accessory Muscle Use] : no accessory muscle use [Normal Rate] : normal rate  [Regular Rhythm] : with a regular rhythm [No Edema] : there was no peripheral edema [No Axillary Lymphadenopathy] : no axillary lymphadenopathy [Soft] : abdomen soft [Non Tender] : non-tender [Normal Posterior Cervical Nodes] : no posterior cervical lymphadenopathy [Normal Anterior Cervical Nodes] : no anterior cervical lymphadenopathy [No Rash] : no rash [Normal Affect] : the affect was normal [Normal Insight/Judgement] : insight and judgment were intact [de-identified] : bilateral breast implants with well healed surgical incisions. No palpable mass.

## 2024-05-21 ENCOUNTER — APPOINTMENT (OUTPATIENT)
Dept: UROLOGY | Facility: CLINIC | Age: 59
End: 2024-05-21

## 2024-05-29 NOTE — DATA REVIEWED
[FreeTextEntry1] : CBC normal in Mar 2024 CMP showed mildly elevated BUN=19 and Glc=114 TSH/Free T3&T4 all normal Mar 2024  
Yes

## 2024-09-07 ENCOUNTER — RX ONLY (RX ONLY)
Age: 59
End: 2024-09-07

## 2024-09-07 ENCOUNTER — OFFICE (OUTPATIENT)
Dept: URBAN - METROPOLITAN AREA CLINIC 100 | Facility: CLINIC | Age: 59
Setting detail: OPHTHALMOLOGY
End: 2024-09-07
Payer: COMMERCIAL

## 2024-09-07 DIAGNOSIS — H01.001: ICD-10-CM

## 2024-09-07 DIAGNOSIS — H01.004: ICD-10-CM

## 2024-09-07 DIAGNOSIS — H00.14: ICD-10-CM

## 2024-09-07 PROCEDURE — 92002 INTRM OPH EXAM NEW PATIENT: CPT | Performed by: OPHTHALMOLOGY

## 2024-09-07 ASSESSMENT — CONFRONTATIONAL VISUAL FIELD TEST (CVF)
OD_FINDINGS: FULL
OS_FINDINGS: FULL

## 2024-09-07 ASSESSMENT — LID EXAM ASSESSMENTS
OD_BLEPHARITIS: RUL 1+
OS_BLEPHARITIS: LUL 1+

## 2024-10-11 ENCOUNTER — APPOINTMENT (OUTPATIENT)
Dept: INTERNAL MEDICINE | Facility: CLINIC | Age: 59
End: 2024-10-11

## 2025-03-31 ENCOUNTER — APPOINTMENT (OUTPATIENT)
Dept: ORTHOPEDIC SURGERY | Facility: CLINIC | Age: 60
End: 2025-03-31
Payer: COMMERCIAL

## 2025-03-31 DIAGNOSIS — S63.632D SPRAIN OF INTERPHALANGEAL JOINT OF RIGHT MIDDLE FINGER, SUBSEQUENT ENCOUNTER: ICD-10-CM

## 2025-03-31 PROCEDURE — 99203 OFFICE O/P NEW LOW 30 MIN: CPT

## 2025-03-31 PROCEDURE — 73140 X-RAY EXAM OF FINGER(S): CPT | Mod: RT

## 2025-03-31 RX ORDER — MELOXICAM 15 MG/1
15 TABLET ORAL
Qty: 30 | Refills: 11 | Status: ACTIVE | COMMUNITY
Start: 2025-03-31 | End: 1900-01-01

## 2025-05-30 ENCOUNTER — LABORATORY RESULT (OUTPATIENT)
Age: 60
End: 2025-05-30

## 2025-06-06 ENCOUNTER — APPOINTMENT (OUTPATIENT)
Dept: INTERNAL MEDICINE | Facility: CLINIC | Age: 60
End: 2025-06-06

## 2025-06-06 ENCOUNTER — NON-APPOINTMENT (OUTPATIENT)
Age: 60
End: 2025-06-06

## 2025-06-06 VITALS
TEMPERATURE: 97.5 F | RESPIRATION RATE: 16 BRPM | OXYGEN SATURATION: 98 % | DIASTOLIC BLOOD PRESSURE: 80 MMHG | SYSTOLIC BLOOD PRESSURE: 113 MMHG | HEART RATE: 66 BPM | BODY MASS INDEX: 27.95 KG/M2 | WEIGHT: 148 LBS

## 2025-06-06 PROCEDURE — 99215 OFFICE O/P EST HI 40 MIN: CPT

## 2025-06-06 RX ORDER — PHENTERMINE HYDROCHLORIDE 15 MG/1
15 CAPSULE ORAL
Qty: 30 | Refills: 1 | Status: ACTIVE | COMMUNITY
Start: 2025-06-06 | End: 1900-01-01

## 2025-06-26 ENCOUNTER — APPOINTMENT (OUTPATIENT)
Dept: PSYCHIATRY | Facility: CLINIC | Age: 60
End: 2025-06-26

## 2025-07-11 ENCOUNTER — APPOINTMENT (OUTPATIENT)
Dept: PSYCHIATRY | Facility: CLINIC | Age: 60
End: 2025-07-11
Payer: COMMERCIAL

## 2025-07-11 PROCEDURE — 90791 PSYCH DIAGNOSTIC EVALUATION: CPT | Mod: 95

## 2025-07-12 ENCOUNTER — RX RENEWAL (OUTPATIENT)
Age: 60
End: 2025-07-12

## 2025-07-21 ENCOUNTER — APPOINTMENT (OUTPATIENT)
Dept: INTERNAL MEDICINE | Facility: CLINIC | Age: 60
End: 2025-07-21
Payer: COMMERCIAL

## 2025-07-21 DIAGNOSIS — R63.5 ABNORMAL WEIGHT GAIN: ICD-10-CM

## 2025-07-21 DIAGNOSIS — F41.9 ANXIETY DISORDER, UNSPECIFIED: ICD-10-CM

## 2025-07-21 DIAGNOSIS — R73.03 PREDIABETES.: ICD-10-CM

## 2025-07-21 PROCEDURE — 99214 OFFICE O/P EST MOD 30 MIN: CPT | Mod: 95

## 2025-07-21 PROCEDURE — G2211 COMPLEX E/M VISIT ADD ON: CPT | Mod: NC,95

## 2025-07-30 ENCOUNTER — APPOINTMENT (OUTPATIENT)
Dept: PSYCHIATRY | Facility: CLINIC | Age: 60
End: 2025-07-30
Payer: COMMERCIAL

## 2025-07-30 PROCEDURE — 90834 PSYTX W PT 45 MINUTES: CPT | Mod: 95

## 2025-08-12 ENCOUNTER — APPOINTMENT (OUTPATIENT)
Dept: PSYCHIATRY | Facility: CLINIC | Age: 60
End: 2025-08-12
Payer: COMMERCIAL

## 2025-08-12 DIAGNOSIS — F43.23 ADJUSTMENT DISORDER WITH MIXED ANXIETY AND DEPRESSED MOOD: ICD-10-CM

## 2025-08-12 PROCEDURE — 90834 PSYTX W PT 45 MINUTES: CPT | Mod: 95

## 2025-08-14 ENCOUNTER — LABORATORY RESULT (OUTPATIENT)
Age: 60
End: 2025-08-14

## 2025-08-26 ENCOUNTER — APPOINTMENT (OUTPATIENT)
Dept: PSYCHIATRY | Facility: CLINIC | Age: 60
End: 2025-08-26
Payer: COMMERCIAL

## 2025-08-26 DIAGNOSIS — F43.23 ADJUSTMENT DISORDER WITH MIXED ANXIETY AND DEPRESSED MOOD: ICD-10-CM

## 2025-08-26 PROCEDURE — 90834 PSYTX W PT 45 MINUTES: CPT | Mod: 95

## 2025-09-08 ENCOUNTER — APPOINTMENT (OUTPATIENT)
Dept: INTERNAL MEDICINE | Facility: CLINIC | Age: 60
End: 2025-09-08
Payer: COMMERCIAL

## 2025-09-08 DIAGNOSIS — F41.9 ANXIETY DISORDER, UNSPECIFIED: ICD-10-CM

## 2025-09-08 DIAGNOSIS — R63.5 ABNORMAL WEIGHT GAIN: ICD-10-CM

## 2025-09-08 DIAGNOSIS — Z78.0 ASYMPTOMATIC MENOPAUSAL STATE: ICD-10-CM

## 2025-09-08 PROCEDURE — G2211 COMPLEX E/M VISIT ADD ON: CPT | Mod: NC,95

## 2025-09-08 PROCEDURE — 99214 OFFICE O/P EST MOD 30 MIN: CPT | Mod: 95

## 2025-09-08 RX ORDER — VENLAFAXINE HYDROCHLORIDE 37.5 MG/1
37.5 CAPSULE, EXTENDED RELEASE ORAL DAILY
Qty: 30 | Refills: 3 | Status: ACTIVE | COMMUNITY
Start: 2025-09-08 | End: 1900-01-01